# Patient Record
Sex: FEMALE | Race: WHITE | Employment: PART TIME | URBAN - NONMETROPOLITAN AREA
[De-identification: names, ages, dates, MRNs, and addresses within clinical notes are randomized per-mention and may not be internally consistent; named-entity substitution may affect disease eponyms.]

---

## 2017-10-05 ENCOUNTER — HOSPITAL ENCOUNTER (EMERGENCY)
Age: 37
Discharge: HOME | End: 2017-10-05
Attending: EMERGENCY MEDICINE

## 2017-10-05 VITALS
RESPIRATION RATE: 20 BRPM | SYSTOLIC BLOOD PRESSURE: 128 MMHG | OXYGEN SATURATION: 100 % | TEMPERATURE: 98.1 F | DIASTOLIC BLOOD PRESSURE: 81 MMHG | HEART RATE: 96 BPM

## 2017-10-05 DIAGNOSIS — K12.0 APHTHOUS ULCER: Primary | ICD-10-CM

## 2017-10-05 PROCEDURE — 99283 EMERGENCY DEPT VISIT LOW MDM: CPT

## 2017-10-05 ASSESSMENT — PAIN SCALES - GENERAL: PAINLEVEL_OUTOF10: 6

## 2017-10-05 ASSESSMENT — ENCOUNTER SYMPTOMS
EYE DISCHARGE: 0
FACIAL SWELLING: 0
SHORTNESS OF BREATH: 0
EYE REDNESS: 0
COUGH: 0
TROUBLE SWALLOWING: 0

## 2017-10-05 ASSESSMENT — PAIN DESCRIPTION - FREQUENCY: FREQUENCY: CONTINUOUS

## 2017-10-05 ASSESSMENT — PAIN DESCRIPTION - ONSET: ONSET: SUDDEN

## 2017-10-05 ASSESSMENT — PAIN DESCRIPTION - ORIENTATION: ORIENTATION: RIGHT

## 2017-10-05 ASSESSMENT — PAIN DESCRIPTION - DESCRIPTORS: DESCRIPTORS: THROBBING

## 2017-10-05 ASSESSMENT — PAIN DESCRIPTION - LOCATION: LOCATION: OTHER (COMMENT)

## 2017-10-05 ASSESSMENT — PAIN DESCRIPTION - DIRECTION: RADIATING_TOWARDS: LOCALIZED

## 2017-10-05 NOTE — ED PROVIDER NOTES
HPI  Patient is a 65-year-old female presents to the emergency department complaining of a bump on her right upper lip. Patient states she has some swelling noted there. She denies any fever or chills. She states she noticed it this morning. She states it's just swollen and that's why she came in. She denies any sore throat or difficulty swallowing or other associated symptoms. She states that it is mildly Tender      Review of Systems   HENT: Positive for mouth sores. Negative for congestion, drooling, ear pain, facial swelling and trouble swallowing. Eyes: Negative for discharge and redness. Respiratory: Negative for cough and shortness of breath. Skin: Negative for rash and wound. Allergic/Immunologic: Negative for environmental allergies. Physical Exam     HEENT: Patient has a early aphthous ulcer to the right upper lip on the inside. It is mildly swollen but there is no erythema or warmthsign of other infection. It is approximately the size of a small pea. There is no swelling erythema or exudate to the oropharynx. Heart: Regular rate and rhythm    Lungs: Clear to auscultation bilaterally    Skin: No rash or abnormality seen. Procedures    MDM  Here in the ED I discussed with the patient and advised her to do hot compresses and to follow up with her primary doctor. I believe this is an early viral aphthous ulcer and should resolve spontaneously. ED Course     Patient will be discharged home.     Diagnosis: Viral aphthous ulcer     Jenaro Mccoy MD  10/05/17 [id number]

## 2017-10-05 NOTE — ED TRIAGE NOTES
Medication list:  complete  Medication information provided by:  Patient  Meds:  per verbal statement

## 2017-10-05 NOTE — ED AVS SNAPSHOT
After Visit Summary  (Discharge Instructions)    Medication List for Home    Based on the information you provided to us as well as any changes during this visit, the following is your updated medication list.  Compare this with your prescription bottles at home. If you have any questions or concerns, contact your primary care physician's office. Daily Medication List (This medication list can be shared with any Healthcare provider who is helping you manage your medications)      ASK your doctor about these medications if you have questions     acetaminophen 500 MG tablet   Commonly known as:  TYLENOL   Take 1,000 mg by mouth every 6 hours as needed for Pain. albuterol sulfate  (90 Base) MCG/ACT inhaler   Commonly known as:  PROAIR HFA   Inhale 2 puffs into the lungs every 6 hours as needed for Wheezing       naproxen 375 MG tablet   Commonly known as:  NAPROSYN   Take 1 tablet by mouth 2 times daily               Allergies as of 10/5/2017        Reactions    Demerol Hcl [Meperidine] Itching    States she honestly doesn't know if allergic,she was told by her mother it was given after nasal surgery and she began itching, was given an antedote     Methylprednisolone Other (See Comments)    Heart racing, GI upset, left arm tingling, jitteriness      Immunizations as of 10/5/2017     Name Date Dose VIS Date Route    Tdap (Boostrix, Adacel) 12/8/2013 0.5 mL 5/9/2013 Intramuscular         After Visit Summary    This summary was created for you. Thank you for entrusting your care to us.   The following information includes details about your hospital/visit stay along with steps you should take to help with your recovery once you leave the hospital.  In this packet, you will find information about the topics listed below:    · Instructions about your medications including a list of your home medications  · A summary of your hospital visit · Follow-up appointments once you have left the hospital  · Your care plan at home      You may receive a survey regarding the care you received during your stay. Your input is valuable to us. We encourage you to complete and return your survey in the envelope provided. We hope you will choose us in the future for your healthcare needs. Patient Information     Patient Name DOB Edrie Angelucci 1980      Care Provided at:     Name Address Phone       [address] [phone]            Your Visit    Here you will find information about your visit, including the reason for your visit. Please take this sheet with you when you visit your doctor or other health care provider in the future. It will help determine the best possible medical care for you at that time. If you have any questions once you leave the hospital, please call the department phone number listed below. Diagnoses this visit     Your diagnosis was APHTHOUS ULCER. Visit Information     Date of Visit Department Dept Phone    10/5/2017 [facility] [phone]      You were seen by     You were seen by Boby Hatch MD.       Follow-up Appointments    Below is a list of your follow-up and future appointments. This may not be a complete list as you may have made appointments directly with providers that we are not aware of or your providers may have made some for you. Please call your providers to confirm appointments. It is important to keep your appointments. Please bring your current insurance card, photo ID, co-pay, and all medication bottles to your appointment. If self-pay, payment is expected at the time of service. Follow-up Information     Follow up with Sam Roth MD In 3 days.     Why:  As needed    Contact information:    [address] [facility] [phone]        Preventive Care        Date Due ? Review your current list of  medications, immunization, and allergies. ? Review your future test results online . ? Review your discharge instructions provided by your caregivers at discharge    Certain functionality such as prescription refills, scheduling appointments or sending messages to your provider are not activated if your provider does not use [facility] in his/her office    For questions regarding your MyChart account call [phone]. If you have a clinical question, please call your doctor's office. The information on all pages of the After Visit Summary has been reviewed with me, the patient and/or responsible adult, by my health care provider(s). I had the opportunity to ask questions regarding this information. I understand I should dispose of my armband safely at home to protect my health information. A complete copy of the After Visit Summary has been given to me, the patient and/or responsible adult. Patient Signature/Responsible Adult: ___________________________________    Nurse Signature: ___________________________________  Resident/MLP Signature: ___________________________________  Attending Signature: ___________________________________    Date:____________Time:____________              Discharge Instructions            Canker Sore: Care Instructions  Your Care Instructions  Canker sores are painful white sores in the mouth. They usually begin with a tingling feeling, followed by a red spot or bump that turns white. Canker sores appear most often on the tongue, inside the cheeks, and inside the lips. They can be very painful and can make talking, eating, and drinking difficult. A canker sore may form after an injury or stretching of tissues in the mouth, which can happen, for example, during a dental procedure or teeth cleaning. If you accidentally bite your tongue or the inside of your cheek, you may end up with a canker sore.  Other possible causes are infection, certain foods, and stress. Canker sores are not contagious. Today, please do hot compresses on the bump until it decreases in size. The pain from your canker sore should decrease in 7 to 10 days, and it should heal completely in 1 to 3 weeks. In most cases, a canker sore will go away by itself. Home treatment can ease pain and discomfort. If you have a large or deep canker sore that does not seem to be getting better after 2 weeks, your doctor may prescribe medicine. Canker sores often come back again. Follow-up care is a key part of your treatment and safety. Be sure to make and go to all appointments, and call your doctor if you are having problems. It's also a good idea to know your test results and keep a list of the medicines you take. How can you care for yourself at home? · Drink cold liquids, such as water or iced tea, or eat flavored ice pops or frozen juices. Use a straw to keep the liquid from coming in contact with your canker sore. · Eat soft, bland foods that are easy to chew and swallow, such as ice cream, custard, applesauce, cottage cheese, macaroni and cheese, soft-cooked eggs, yogurt, or cream soups. · Cut foods into small pieces, or grind, mash, blend, or puree foods to make them easier to chew and swallow. · While your canker sore heals, avoid coffee, chocolate, spicy and salty foods, citrus fruits, nuts, seeds, and tomatoes. · To soothe your canker sore and help it heal:  ¨ Use an over-the-counter numbing medicine, such as Orabase or Anbesol. ¨ Dab a bit of Milk of Magnesia on the canker sore 3 or 4 times a day. · Put ice on your sore to reduce the pain. · Take anti-inflammatory medicines to reduce pain, as needed. These include ibuprofen (Advil, Motrin) and naproxen (Aleve). Read and follow all instructions on the label. · Use a soft-bristle toothbrush, and brush your teeth well but carefully.   · Do not smoke or use spit tobacco. Tobacco can cause mouth problems and slow healing. If you need help quitting, talk to your doctor about stop-smoking programs and medicines. These can increase your chances of quitting for good. When should you call for help? Call your doctor now or seek immediate medical care if:  · You have signs of infection, such as:  ¨ Increased pain, swelling, warmth, or redness. ¨ Red streaks leading from the area. ¨ Pus draining from the area. ¨ A fever. Watch closely for changes in your health, and be sure to contact your doctor if:  · You do not get better as expected. Where can you learn more? Go to https://Super[employer] LabspeSkytreeeb.21viaNet. org and sign in to your [employer] account. Enter D047 in the StudyEgg box to learn more about \"Canker Sore: Care Instructions. \"     If you do not have an account, please click on the \"Sign Up Now\" link. Current as of: December 28, 2016  Content Version: 11.3  © 2191-1149 [employer]. Care instructions adapted under license by [facility] ([facility]). If you have questions about a medical condition or this instruction, always ask your healthcare professional. [place] [address] any warranty or liability for your use of this information.

## 2019-09-28 ENCOUNTER — APPOINTMENT (OUTPATIENT)
Dept: CT IMAGING | Age: 39
End: 2019-09-28
Payer: COMMERCIAL

## 2019-09-28 ENCOUNTER — HOSPITAL ENCOUNTER (EMERGENCY)
Age: 39
Discharge: HOME | End: 2019-09-28
Attending: FAMILY MEDICINE
Payer: COMMERCIAL

## 2019-09-28 VITALS
HEART RATE: 87 BPM | BODY MASS INDEX: 18.36 KG/M2 | RESPIRATION RATE: 18 BRPM | SYSTOLIC BLOOD PRESSURE: 123 MMHG | OXYGEN SATURATION: 99 % | DIASTOLIC BLOOD PRESSURE: 77 MMHG | HEIGHT: 61 IN | WEIGHT: 97.22 LBS | TEMPERATURE: 98.3 F

## 2019-09-28 DIAGNOSIS — N94.89 ADNEXAL MASS: Primary | ICD-10-CM

## 2019-09-28 LAB
ABSOLUTE EOS #: 0.1 K/UL (ref 0–0.4)
ABSOLUTE IMMATURE GRANULOCYTE: NORMAL K/UL (ref 0–0.3)
ABSOLUTE LYMPH #: 2.1 K/UL (ref 1–4.8)
ABSOLUTE MONO #: 0.5 K/UL (ref 0–1)
ALBUMIN SERPL-MCNC: 4.5 G/DL (ref 3.5–5.2)
ALBUMIN/GLOBULIN RATIO: ABNORMAL (ref 1–2.5)
ALP BLD-CCNC: 47 U/L (ref 35–104)
ALT SERPL-CCNC: 10 U/L (ref 5–33)
AMYLASE: 49 U/L (ref 28–100)
ANION GAP SERPL CALCULATED.3IONS-SCNC: 12 MMOL/L (ref 9–17)
AST SERPL-CCNC: 15 U/L
BASOPHILS # BLD: 0 % (ref 0–2)
BASOPHILS ABSOLUTE: 0 K/UL (ref 0–0.2)
BILIRUB SERPL-MCNC: 0.48 MG/DL (ref 0.3–1.2)
BILIRUBIN URINE: NEGATIVE
BUN BLDV-MCNC: 8 MG/DL (ref 6–20)
BUN/CREAT BLD: 14 (ref 9–20)
CALCIUM SERPL-MCNC: 10.1 MG/DL (ref 8.6–10.4)
CHLORIDE BLD-SCNC: 104 MMOL/L (ref 98–107)
CO2: 22 MMOL/L (ref 20–31)
COLOR: ABNORMAL
COMMENT UA: ABNORMAL
CREAT SERPL-MCNC: 0.58 MG/DL (ref 0.5–0.9)
DIFFERENTIAL TYPE: YES
EOSINOPHILS RELATIVE PERCENT: 1 % (ref 0–5)
GFR AFRICAN AMERICAN: >60 ML/MIN
GFR NON-AFRICAN AMERICAN: >60 ML/MIN
GFR SERPL CREATININE-BSD FRML MDRD: ABNORMAL ML/MIN/{1.73_M2}
GFR SERPL CREATININE-BSD FRML MDRD: ABNORMAL ML/MIN/{1.73_M2}
GLUCOSE BLD-MCNC: 111 MG/DL (ref 70–99)
GLUCOSE URINE: NEGATIVE
HCG QUALITATIVE: NEGATIVE
HCT VFR BLD CALC: 42.1 % (ref 36–46)
HEMOGLOBIN: 14.4 G/DL (ref 12–16)
IMMATURE GRANULOCYTES: NORMAL %
KETONES, URINE: NEGATIVE
LEUKOCYTE ESTERASE, URINE: NEGATIVE
LIPASE: 26 U/L (ref 13–60)
LYMPHOCYTES # BLD: 22 % (ref 15–40)
MCH RBC QN AUTO: 32.2 PG (ref 26–34)
MCHC RBC AUTO-ENTMCNC: 34.3 G/DL (ref 31–37)
MCV RBC AUTO: 94 FL (ref 80–100)
MONOCYTES # BLD: 5 % (ref 4–8)
NITRITE, URINE: NEGATIVE
NRBC AUTOMATED: NORMAL PER 100 WBC
PDW BLD-RTO: 13.1 % (ref 12.1–15.2)
PH UA: 6.5 (ref 5–8)
PLATELET # BLD: 195 K/UL (ref 140–450)
PLATELET ESTIMATE: NORMAL
PMV BLD AUTO: NORMAL FL (ref 6–12)
POTASSIUM SERPL-SCNC: 4 MMOL/L (ref 3.7–5.3)
PROTEIN UA: NEGATIVE
RBC # BLD: 4.48 M/UL (ref 4–5.2)
RBC # BLD: NORMAL 10*6/UL
SEG NEUTROPHILS: 72 % (ref 47–75)
SEGMENTED NEUTROPHILS ABSOLUTE COUNT: 6.7 K/UL (ref 2.5–7)
SODIUM BLD-SCNC: 138 MMOL/L (ref 135–144)
SPECIFIC GRAVITY UA: 1 (ref 1–1.03)
TOTAL PROTEIN: 7.5 G/DL (ref 6.4–8.3)
TURBIDITY: CLEAR
URINE HGB: NEGATIVE
UROBILINOGEN, URINE: NORMAL
WBC # BLD: 9.4 K/UL (ref 3.5–11)
WBC # BLD: NORMAL 10*3/UL

## 2019-09-28 PROCEDURE — 80053 COMPREHEN METABOLIC PANEL: CPT

## 2019-09-28 PROCEDURE — 84703 CHORIONIC GONADOTROPIN ASSAY: CPT

## 2019-09-28 PROCEDURE — 6360000002 HC RX W HCPCS: Performed by: FAMILY MEDICINE

## 2019-09-28 PROCEDURE — 96374 THER/PROPH/DIAG INJ IV PUSH: CPT

## 2019-09-28 PROCEDURE — 96361 HYDRATE IV INFUSION ADD-ON: CPT

## 2019-09-28 PROCEDURE — 74176 CT ABD & PELVIS W/O CONTRAST: CPT

## 2019-09-28 PROCEDURE — 36415 COLL VENOUS BLD VENIPUNCTURE: CPT

## 2019-09-28 PROCEDURE — 99284 EMERGENCY DEPT VISIT MOD MDM: CPT

## 2019-09-28 PROCEDURE — 83690 ASSAY OF LIPASE: CPT

## 2019-09-28 PROCEDURE — 82150 ASSAY OF AMYLASE: CPT

## 2019-09-28 PROCEDURE — 81003 URINALYSIS AUTO W/O SCOPE: CPT

## 2019-09-28 PROCEDURE — 2580000003 HC RX 258: Performed by: FAMILY MEDICINE

## 2019-09-28 PROCEDURE — 85025 COMPLETE CBC W/AUTO DIFF WBC: CPT

## 2019-09-28 RX ORDER — KETOROLAC TROMETHAMINE 30 MG/ML
15 INJECTION, SOLUTION INTRAMUSCULAR; INTRAVENOUS ONCE
Status: COMPLETED | OUTPATIENT
Start: 2019-09-28 | End: 2019-09-28

## 2019-09-28 RX ORDER — KETOROLAC TROMETHAMINE 10 MG/1
10 TABLET, FILM COATED ORAL EVERY 6 HOURS PRN
Qty: 5 TABLET | Refills: 0 | Status: SHIPPED | OUTPATIENT
Start: 2019-09-28

## 2019-09-28 RX ORDER — 0.9 % SODIUM CHLORIDE 0.9 %
1000 INTRAVENOUS SOLUTION INTRAVENOUS ONCE
Status: COMPLETED | OUTPATIENT
Start: 2019-09-28 | End: 2019-09-28

## 2019-09-28 RX ADMIN — KETOROLAC TROMETHAMINE 15 MG: 30 INJECTION, SOLUTION INTRAMUSCULAR; INTRAVENOUS at 13:43

## 2019-09-28 RX ADMIN — SODIUM CHLORIDE 1000 ML: 9 INJECTION, SOLUTION INTRAVENOUS at 13:43

## 2019-09-28 ASSESSMENT — PAIN DESCRIPTION - ORIENTATION: ORIENTATION: RIGHT;LOWER

## 2019-09-28 ASSESSMENT — PAIN DESCRIPTION - PAIN TYPE: TYPE: ACUTE PAIN

## 2019-09-28 ASSESSMENT — PAIN DESCRIPTION - LOCATION: LOCATION: ABDOMEN

## 2019-09-28 ASSESSMENT — PAIN SCALES - GENERAL
PAINLEVEL_OUTOF10: 5
PAINLEVEL_OUTOF10: 3
PAINLEVEL_OUTOF10: 5

## 2019-09-28 ASSESSMENT — PAIN DESCRIPTION - FREQUENCY: FREQUENCY: CONTINUOUS

## 2019-09-28 ASSESSMENT — PAIN DESCRIPTION - ONSET: ONSET: ON-GOING

## 2019-09-28 ASSESSMENT — PAIN DESCRIPTION - DESCRIPTORS: DESCRIPTORS: SHARP

## 2023-01-09 ENCOUNTER — OFFICE VISIT (OUTPATIENT)
Dept: FAMILY MEDICINE CLINIC | Age: 43
End: 2023-01-09
Payer: COMMERCIAL

## 2023-01-09 VITALS
WEIGHT: 108 LBS | RESPIRATION RATE: 18 BRPM | HEART RATE: 61 BPM | OXYGEN SATURATION: 100 % | DIASTOLIC BLOOD PRESSURE: 72 MMHG | HEIGHT: 61 IN | BODY MASS INDEX: 20.39 KG/M2 | SYSTOLIC BLOOD PRESSURE: 108 MMHG

## 2023-01-09 DIAGNOSIS — Z13.220 LIPID SCREENING: ICD-10-CM

## 2023-01-09 DIAGNOSIS — R00.2 PALPITATION: Primary | ICD-10-CM

## 2023-01-09 DIAGNOSIS — R07.9 CHEST PAIN, UNSPECIFIED TYPE: ICD-10-CM

## 2023-01-09 PROCEDURE — G8427 DOCREV CUR MEDS BY ELIG CLIN: HCPCS | Performed by: INTERNAL MEDICINE

## 2023-01-09 PROCEDURE — 99204 OFFICE O/P NEW MOD 45 MIN: CPT | Performed by: INTERNAL MEDICINE

## 2023-01-09 PROCEDURE — G8420 CALC BMI NORM PARAMETERS: HCPCS | Performed by: INTERNAL MEDICINE

## 2023-01-09 PROCEDURE — 1036F TOBACCO NON-USER: CPT | Performed by: INTERNAL MEDICINE

## 2023-01-09 PROCEDURE — G8484 FLU IMMUNIZE NO ADMIN: HCPCS | Performed by: INTERNAL MEDICINE

## 2023-01-09 SDOH — ECONOMIC STABILITY: FOOD INSECURITY: WITHIN THE PAST 12 MONTHS, YOU WORRIED THAT YOUR FOOD WOULD RUN OUT BEFORE YOU GOT MONEY TO BUY MORE.: NEVER TRUE

## 2023-01-09 SDOH — ECONOMIC STABILITY: FOOD INSECURITY: WITHIN THE PAST 12 MONTHS, THE FOOD YOU BOUGHT JUST DIDN'T LAST AND YOU DIDN'T HAVE MONEY TO GET MORE.: NEVER TRUE

## 2023-01-09 ASSESSMENT — PATIENT HEALTH QUESTIONNAIRE - PHQ9
SUM OF ALL RESPONSES TO PHQ QUESTIONS 1-9: 0
1. LITTLE INTEREST OR PLEASURE IN DOING THINGS: 0
SUM OF ALL RESPONSES TO PHQ QUESTIONS 1-9: 0
2. FEELING DOWN, DEPRESSED OR HOPELESS: 0
SUM OF ALL RESPONSES TO PHQ9 QUESTIONS 1 & 2: 0
SUM OF ALL RESPONSES TO PHQ QUESTIONS 1-9: 0
SUM OF ALL RESPONSES TO PHQ QUESTIONS 1-9: 0

## 2023-01-09 ASSESSMENT — ENCOUNTER SYMPTOMS
COUGH: 0
SORE THROAT: 0
NAUSEA: 0
SHORTNESS OF BREATH: 0
ABDOMINAL PAIN: 0

## 2023-01-09 ASSESSMENT — SOCIAL DETERMINANTS OF HEALTH (SDOH): HOW HARD IS IT FOR YOU TO PAY FOR THE VERY BASICS LIKE FOOD, HOUSING, MEDICAL CARE, AND HEATING?: NOT HARD AT ALL

## 2023-01-09 NOTE — PATIENT INSTRUCTIONS
SURVEY:    You may be receiving a survey from [email or web address] regarding your visit today. Please complete the survey to enable us to provide the highest quality of care to you and your family. If you cannot score us a very good on any question, please call the office to discuss how we could have made your experience a very good one. Thank you.   MD Keshia العلي MD Launie Cairo, MD Lindbergh Silvius, DO  Jeannine Gutierres, [facility] [place], [address] [facility], [address] [place] [address]  Michelle Grover, [facility]

## 2023-01-09 NOTE — PROGRESS NOTES
Ana Notes    Name: Leola Jordan  : 1980         Chief Complaint:     Chief Complaint   Patient presents with    New Patient     Patient has been having heart palpations and ear ringing with tingling in arms       History of Present Illness:        Marty Lucero is a new patient who presents to office to establish care. She has no history of chronic medical problems, currently not on prescription medications. Has not seen a family physician for many years. She c/o episodes of palpitations, chest pains, \" whooshing \" sound in her ears, \"left arm goes numb\", feels dizzy. Has to rest for symptoms to resolve. She had these symptoms for about 3 years. Symptoms occur  2-3 times per week. Has not seen a physician and has not had any work up so far. She quit smoking about a year ago. Used to smoke 0.5 ppd x 20 years. Mother had a heart attack at age 72. She states that when she was younger she was diagnosed with ADD. She feels that she might have anxiety disorder. Chest Pain   This is a recurrent problem. The current episode started more than 1 year ago. The problem occurs every several days. The problem has been unchanged. The pain is present in the substernal region. The pain is moderate. The quality of the pain is described as heavy. The pain radiates to the left shoulder. Associated symptoms include palpitations. Pertinent negatives include no abdominal pain, cough, fever, headaches, nausea or shortness of breath. The pain is aggravated by nothing. She has tried nothing for the symptoms. Palpitations   This is a chronic problem. The current episode started more than 1 year ago. The problem occurs every several days. The problem has been unchanged. Nothing aggravates the symptoms. Associated symptoms include anxiety and chest pain. Pertinent negatives include no coughing, fever, nausea or shortness of breath. She has tried bed rest for the symptoms.  The treatment provided significant relief. Risk factors include stress and smoking/tobacco exposure. Past Medical History:     Past Medical History:   Diagnosis Date    Chronic back pain     Headache       Reviewed all health maintenance requirements and orderedappropriate tests  Health Maintenance Due   Topic Date Due    Depression Screen  Never done    HIV screen  Never done    Hepatitis C screen  Never done    Cervical cancer screen  Never done    Lipids  Never done    COVID-19 Vaccine (2 - Booster for Lulu series) 08/23/2021    Flu vaccine (1) 08/01/2022       Past Surgical History:     Past Surgical History:   Procedure Laterality Date    DENTAL SURGERY      DILATION AND CURETTAGE OF UTERUS      NASAL SEPTUM SURGERY      OTHER SURGICAL HISTORY Left 05/19/2017    Decompression of L4-5    TIBIA FRACTURE SURGERY      right        Medications:       Prior to Admission medications    Medication Sig Start Date End Date Taking? Authorizing Provider   ketorolac (TORADOL) 10 MG tablet Take 1 tablet by mouth every 6 hours as needed for Pain  Patient not taking: Reported on 1/9/2023 9/28/19   Joao Acosta MD   acetaminophen (TYLENOL) 500 MG tablet Take 1,000 mg by mouth every 6 hours as needed for Pain. Patient not taking: Reported on 1/9/2023    Historical Provider, MD        Allergies:       Demerol hcl [meperidine] and Methylprednisolone    Social History:     Tobacco: reports that she has quit smoking. Her smoking use included cigarettes. She has a 6.00 pack-year smoking history. She has never used smokeless tobacco.  Alcohol:      reports no history of alcohol use. Drug Use:  reports no history of drug use. Family History:     No family history on file. Review of Systems:         Review of Systems   Constitutional:  Negative for chills and fever. HENT:  Negative for congestion and sore throat. Respiratory:  Negative for cough and shortness of breath.     Cardiovascular:  Positive for chest pain and palpitations. Gastrointestinal:  Negative for abdominal pain and nausea. Genitourinary:  Negative for dysuria. Skin:  Negative for rash. Neurological:  Negative for headaches. Psychiatric/Behavioral:  The patient is nervous/anxious. Physical Exam:     Vitals:  /72 (Site: Right Upper Arm, Position: Sitting, Cuff Size: Small Adult)   Pulse 61   Resp 18   Ht 5' 1\" (1.549 m)   Wt 108 lb (49 kg)   SpO2 100%   BMI 20.41 kg/m²       Physical Exam  Vitals reviewed. Constitutional:       General: She is not in acute distress. Appearance: She is well-developed. HENT:      Head: Normocephalic and atraumatic. Neck:      Thyroid: No thyromegaly. Cardiovascular:      Rate and Rhythm: Normal rate and regular rhythm. Heart sounds: Normal heart sounds. No murmur heard. Pulmonary:      Effort: Pulmonary effort is normal.      Breath sounds: Normal breath sounds. No wheezing or rales. Abdominal:      General: Bowel sounds are normal. There is no distension. Palpations: Abdomen is soft. There is no mass. Tenderness: There is no abdominal tenderness. Musculoskeletal:         General: Normal range of motion. Lymphadenopathy:      Cervical: No cervical adenopathy. Skin:     General: Skin is warm and dry. Findings: No rash. Neurological:      Mental Status: She is alert and oriented to person, place, and time.    Psychiatric:         Behavior: Behavior normal.         Judgment: Judgment normal.             Data:     Lab Results   Component Value Date/Time     09/28/2019 12:55 PM    K 4.0 09/28/2019 12:55 PM     09/28/2019 12:55 PM    CO2 22 09/28/2019 12:55 PM    BUN 8 09/28/2019 12:55 PM    CREATININE 0.58 09/28/2019 12:55 PM    GLUCOSE 111 09/28/2019 12:55 PM    PROT 7.5 09/28/2019 12:55 PM    LABALBU 4.5 09/28/2019 12:55 PM    BILITOT 0.48 09/28/2019 12:55 PM    ALKPHOS 47 09/28/2019 12:55 PM    AST 15 09/28/2019 12:55 PM    ALT 10 09/28/2019 12:55 PM Lab Results   Component Value Date/Time    WBC 9.4 09/28/2019 12:55 PM    RBC 4.48 09/28/2019 12:55 PM    HGB 14.4 09/28/2019 12:55 PM    HCT 42.1 09/28/2019 12:55 PM    MCV 94.0 09/28/2019 12:55 PM    MCH 32.2 09/28/2019 12:55 PM    MCHC 34.3 09/28/2019 12:55 PM    RDW 13.1 09/28/2019 12:55 PM     09/28/2019 12:55 PM    MPV NOT REPORTED 09/28/2019 12:55 PM     No results found for: TSH  No results found for: CHOL, LDL, HDL, PSA, LABA1C       Assessment & Plan        Diagnosis Orders   1. Palpitation  CBC with Auto Differential    Basic Metabolic Panel    EKG 12 lead    TSH with Reflex    XR CHEST (2 VW)    Magnesium      2. Chest pain, unspecified type  CBC with Auto Differential    Basic Metabolic Panel    XR CHEST (2 VW)      3. Lipid screening  Lipid Panel    Hepatic Function Panel      We will order pertinent labs for evaluation of palpitations. Rule out metabolic disorder, anemia, thyroid problems, electrolyte disorders. Also ordered EKG, chest x-ray. Further work-up will depend on the results. Follow-up in 1 or 2 weeks                Completed Refills   Requested Prescriptions      No prescriptions requested or ordered in this encounter     No follow-ups on file. No orders of the defined types were placed in this encounter.     Orders Placed This Encounter   Procedures    XR CHEST (2 VW)     Standing Status:   Future     Standing Expiration Date:   1/9/2024    CBC with Auto Differential     Standing Status:   Future     Standing Expiration Date:   8/4/9541    Basic Metabolic Panel     Standing Status:   Future     Standing Expiration Date:   1/9/2024    Lipid Panel     Standing Status:   Future     Standing Expiration Date:   1/9/2024     Order Specific Question:   Is Patient Fasting?/# of Hours     Answer:   yes    TSH with Reflex     Standing Status:   Future     Standing Expiration Date:   1/9/2024    Hepatic Function Panel     Standing Status:   Future     Standing Expiration Date: 1/9/2024    Magnesium     Standing Status:   Future     Standing Expiration Date:   1/9/2024    EKG 12 lead     Standing Status:   Future     Standing Expiration Date:   3/10/2023     Order Specific Question:   Reason for Exam?     Answer:   Chest pain           Patient Instructions     SURVEY:    You may be receiving a survey from [employer] regarding your visit today. Please complete the survey to enable us to provide the highest quality of care to you and your family. If you cannot score us a very good on any question, please call the office to discuss how we could have made your experience a very good one. Thank you.   MD Madhuri العلي MD Neta Dine, MD Neville Antoine, DO Jeannine Gutierres, PM  [facility] [place], [place]  [place], [place]  [facility]    Electronically signed by Grace Moran MD on 1/9/2023 at 1:53 PM           Completed Refills      Requested Prescriptions      No prescriptions requested or ordered in this encounter

## 2023-01-15 ENCOUNTER — HOSPITAL ENCOUNTER (OUTPATIENT)
Age: 43
Discharge: HOME | End: 2023-01-15
Payer: MEDICAID

## 2023-01-15 ENCOUNTER — HOSPITAL ENCOUNTER (OUTPATIENT)
Dept: GENERAL RADIOLOGY | Age: 43
Discharge: HOME | End: 2023-01-17
Payer: MEDICAID

## 2023-01-15 ENCOUNTER — HOSPITAL ENCOUNTER (OUTPATIENT)
Age: 43
Discharge: HOME | End: 2023-01-17
Payer: MEDICAID

## 2023-01-15 DIAGNOSIS — R07.9 CHEST PAIN, UNSPECIFIED TYPE: ICD-10-CM

## 2023-01-15 DIAGNOSIS — R00.2 PALPITATION: ICD-10-CM

## 2023-01-15 DIAGNOSIS — Z13.220 LIPID SCREENING: ICD-10-CM

## 2023-01-15 LAB
ABSOLUTE EOS #: 0.1 K/UL (ref 0–0.4)
ABSOLUTE LYMPH #: 1.6 K/UL (ref 1–4.8)
ABSOLUTE MONO #: 0.3 K/UL (ref 0–1)
ALBUMIN SERPL-MCNC: 4.4 G/DL (ref 3.5–5.2)
ALP BLD-CCNC: 45 U/L (ref 35–104)
ALT SERPL-CCNC: 11 U/L (ref 5–33)
ANION GAP SERPL CALCULATED.3IONS-SCNC: 11 MMOL/L (ref 9–17)
AST SERPL-CCNC: 16 U/L
BASOPHILS # BLD: 0 % (ref 0–2)
BASOPHILS ABSOLUTE: 0 K/UL (ref 0–0.2)
BILIRUB SERPL-MCNC: 0.5 MG/DL (ref 0.3–1.2)
BILIRUBIN DIRECT: <0.1 MG/DL
BILIRUBIN, INDIRECT: NORMAL MG/DL (ref 0–1)
BUN BLDV-MCNC: 10 MG/DL (ref 6–20)
BUN/CREAT BLD: 14 (ref 9–20)
CALCIUM SERPL-MCNC: 9 MG/DL (ref 8.6–10.4)
CHLORIDE BLD-SCNC: 101 MMOL/L (ref 98–107)
CHOLESTEROL/HDL RATIO: 4.2
CHOLESTEROL: 208 MG/DL
CO2: 23 MMOL/L (ref 20–31)
CREAT SERPL-MCNC: 0.7 MG/DL (ref 0.5–0.9)
DIFFERENTIAL TYPE: YES
EKG ATRIAL RATE: 61 BPM
EKG P AXIS: 67 DEGREES
EKG P-R INTERVAL: 118 MS
EKG Q-T INTERVAL: 410 MS
EKG QRS DURATION: 78 MS
EKG QTC CALCULATION (BAZETT): 412 MS
EKG R AXIS: 79 DEGREES
EKG T AXIS: 63 DEGREES
EKG VENTRICULAR RATE: 61 BPM
EOSINOPHILS RELATIVE PERCENT: 1 % (ref 0–5)
GFR SERPL CREATININE-BSD FRML MDRD: >60 ML/MIN/1.73M2
GLUCOSE BLD-MCNC: 97 MG/DL (ref 70–99)
HCT VFR BLD CALC: 40.1 % (ref 36–46)
HDLC SERPL-MCNC: 50 MG/DL
HEMOGLOBIN: 13.4 G/DL (ref 12–16)
LDL CHOLESTEROL: 145 MG/DL (ref 0–130)
LYMPHOCYTES # BLD: 33 % (ref 15–40)
MAGNESIUM: 1.8 MG/DL (ref 1.6–2.6)
MCH RBC QN AUTO: 31.4 PG (ref 26–34)
MCHC RBC AUTO-ENTMCNC: 33.5 G/DL (ref 31–37)
MCV RBC AUTO: 93.8 FL (ref 80–100)
MONOCYTES # BLD: 6 % (ref 4–8)
PDW BLD-RTO: 12.9 % (ref 12.1–15.2)
PLATELET # BLD: 209 K/UL (ref 140–450)
POTASSIUM SERPL-SCNC: 3.7 MMOL/L (ref 3.7–5.3)
RBC # BLD: 4.27 M/UL (ref 4–5.2)
SEG NEUTROPHILS: 60 % (ref 47–75)
SEGMENTED NEUTROPHILS ABSOLUTE COUNT: 3 K/UL (ref 2.5–7)
SODIUM BLD-SCNC: 135 MMOL/L (ref 135–144)
TOTAL PROTEIN: 7.2 G/DL (ref 6.4–8.3)
TRIGL SERPL-MCNC: 67 MG/DL
TSH SERPL DL<=0.05 MIU/L-ACNC: 2.03 UIU/ML (ref 0.3–5)
WBC # BLD: 4.9 K/UL (ref 3.5–11)

## 2023-01-15 PROCEDURE — 83735 ASSAY OF MAGNESIUM: CPT

## 2023-01-15 PROCEDURE — 36415 COLL VENOUS BLD VENIPUNCTURE: CPT

## 2023-01-15 PROCEDURE — 85025 COMPLETE CBC W/AUTO DIFF WBC: CPT

## 2023-01-15 PROCEDURE — 71046 X-RAY EXAM CHEST 2 VIEWS: CPT

## 2023-01-15 PROCEDURE — 80076 HEPATIC FUNCTION PANEL: CPT

## 2023-01-15 PROCEDURE — 84443 ASSAY THYROID STIM HORMONE: CPT

## 2023-01-15 PROCEDURE — 80061 LIPID PANEL: CPT

## 2023-01-15 PROCEDURE — 93005 ELECTROCARDIOGRAM TRACING: CPT

## 2023-01-15 PROCEDURE — 80048 BASIC METABOLIC PNL TOTAL CA: CPT

## 2023-01-16 ENCOUNTER — TELEPHONE (OUTPATIENT)
Dept: FAMILY MEDICINE CLINIC | Age: 43
End: 2023-01-16

## 2023-01-16 NOTE — TELEPHONE ENCOUNTER
----- Message from Cata Rodríguez MD sent at 1/16/2023 10:26 AM EST -----  Please let the patient know that her EKG and lab results are essentially normal.  Thank you  ----- Message -----  From: Agus Gramajo Incoming Ekg Results From [facility]  Sent: 1/15/2023   4:54 PM EST  To: Cata Rodríguez MD

## 2023-01-16 NOTE — TELEPHONE ENCOUNTER
----- Message from Po Marroquin MD sent at 1/16/2023  3:00 PM EST -----  Please let the patient know her chest x-ray shows mild emphysema otherwise normal  Thank you    ----- Message -----  From: Agus Gramajo Incoming Radiant Results From [facility]  Sent: 1/16/2023   2:14 PM EST  To: Po Marroquin MD

## 2023-02-09 ENCOUNTER — OFFICE VISIT (OUTPATIENT)
Dept: FAMILY MEDICINE CLINIC | Age: 43
End: 2023-02-09
Payer: MEDICAID

## 2023-02-09 VITALS
HEART RATE: 68 BPM | DIASTOLIC BLOOD PRESSURE: 68 MMHG | RESPIRATION RATE: 18 BRPM | BODY MASS INDEX: 20.5 KG/M2 | HEIGHT: 61 IN | SYSTOLIC BLOOD PRESSURE: 98 MMHG | WEIGHT: 108.6 LBS | OXYGEN SATURATION: 98 %

## 2023-02-09 DIAGNOSIS — M51.36 LUMBAR DEGENERATIVE DISC DISEASE: Primary | ICD-10-CM

## 2023-02-09 DIAGNOSIS — R20.2 PARESTHESIA OF LEFT LEG: ICD-10-CM

## 2023-02-09 DIAGNOSIS — M54.42 ACUTE LEFT-SIDED LOW BACK PAIN WITH LEFT-SIDED SCIATICA: ICD-10-CM

## 2023-02-09 DIAGNOSIS — F41.9 ANXIETY: ICD-10-CM

## 2023-02-09 PROCEDURE — 99214 OFFICE O/P EST MOD 30 MIN: CPT | Performed by: INTERNAL MEDICINE

## 2023-02-09 RX ORDER — GABAPENTIN 300 MG/1
300 CAPSULE ORAL 2 TIMES DAILY
Qty: 60 CAPSULE | Refills: 0 | Status: SHIPPED | OUTPATIENT
Start: 2023-02-09 | End: 2023-03-11

## 2023-02-09 RX ORDER — ESCITALOPRAM OXALATE 10 MG/1
10 TABLET ORAL DAILY
Qty: 30 TABLET | Refills: 3 | Status: SHIPPED | OUTPATIENT
Start: 2023-02-09

## 2023-02-09 RX ORDER — PREDNISONE 20 MG/1
TABLET ORAL
Qty: 15 TABLET | Refills: 0 | Status: SHIPPED | OUTPATIENT
Start: 2023-02-09

## 2023-02-09 SDOH — ECONOMIC STABILITY: FOOD INSECURITY: WITHIN THE PAST 12 MONTHS, THE FOOD YOU BOUGHT JUST DIDN'T LAST AND YOU DIDN'T HAVE MONEY TO GET MORE.: NEVER TRUE

## 2023-02-09 SDOH — ECONOMIC STABILITY: FOOD INSECURITY: WITHIN THE PAST 12 MONTHS, YOU WORRIED THAT YOUR FOOD WOULD RUN OUT BEFORE YOU GOT MONEY TO BUY MORE.: NEVER TRUE

## 2023-02-09 SDOH — ECONOMIC STABILITY: INCOME INSECURITY: HOW HARD IS IT FOR YOU TO PAY FOR THE VERY BASICS LIKE FOOD, HOUSING, MEDICAL CARE, AND HEATING?: NOT HARD AT ALL

## 2023-02-09 SDOH — ECONOMIC STABILITY: HOUSING INSECURITY
IN THE LAST 12 MONTHS, WAS THERE A TIME WHEN YOU DID NOT HAVE A STEADY PLACE TO SLEEP OR SLEPT IN A SHELTER (INCLUDING NOW)?: NO

## 2023-02-09 ASSESSMENT — ENCOUNTER SYMPTOMS
SHORTNESS OF BREATH: 0
BOWEL INCONTINENCE: 0
SORE THROAT: 0
BACK PAIN: 1
COUGH: 0
NAUSEA: 0
ABDOMINAL PAIN: 0

## 2023-02-09 NOTE — PROGRESS NOTES
HPI Notes    Name: Randy Henning  : 1980         Chief Complaint:     Chief Complaint   Patient presents with    Follow-up     Patient is asking about results for labs,ekg  and her back issues       History of Present Illness:        Josette Mendoza presents to office to follow up for Anxiety, palpitations, CP, dizziness. Recent work up including EKG was unremarkable. Patient thinks her symptoms are most likely related to her Anxiety and would like to be treated. Josette Mendoza presents with the complaint of anxiety. Current symptoms of anxiety include chest pain, dizziness, feelings of losing control, irritable, palpitations, paresthesias, racing thoughts, sweating. Josette Mendoza has felt anxious  for about a year . Josette Mendoza does have a history of anxiety or depression. There is  a family history of depression or anxiety. Current identifiable stressors include work stress, back pain. Zhane Gonzales denies the use of drugs or alcohol. Josette Mendoza denies depression. Today she also c/o back pain  Says had an  emergent back surgery about ? 2 years ago for lumbar  disk herniation. Everything was fine  after surgery until past year the pain started gradually  getting worse. Says her left leg is affected, has \"bad burning \"sensation. Left leg is tingling and sometimes feels weak. The pain in her lower back is squeezing,  burning, achy. Ibuprofen and Tylenol not helping. Back Pain  This is a chronic problem. The current episode started more than 1 year ago. The problem has been gradually worsening since onset. The pain is present in the lumbar spine and sacro-iliac. The pain radiates to the left knee, left thigh and left foot. The pain is severe. The symptoms are aggravated by bending, twisting, standing and position. Associated symptoms include chest pain, numbness, paresthesias, tingling and weakness.  Pertinent negatives include no abdominal pain, bladder incontinence, bowel incontinence, dysuria, fever, headaches, paresis or perianal numbness. She has tried NSAIDs (Tylenol) for the symptoms. The treatment provided mild relief. Past Medical History:     Past Medical History:   Diagnosis Date    Chronic back pain     Headache       Reviewed all health maintenance requirements and orderedappropriate tests  Health Maintenance Due   Topic Date Due    HIV screen  Never done    Hepatitis C screen  Never done    Cervical cancer screen  Never done    COVID-19 Vaccine (2 - Booster for Lulu series) 08/23/2021    Flu vaccine (1) 08/01/2022       Past Surgical History:     Past Surgical History:   Procedure Laterality Date    DENTAL SURGERY      DILATION AND CURETTAGE OF UTERUS      NASAL SEPTUM SURGERY      OTHER SURGICAL HISTORY Left 05/19/2017    Decompression of L4-5    TIBIA FRACTURE SURGERY      right        Medications:       Prior to Admission medications    Medication Sig Start Date End Date Taking? Authorizing Provider   predniSONE (DELTASONE) 20 MG tablet Take 2 tablets po daily x 5 days, then 1 tab po daily x 5 days 2/9/23  Yes Guero Barrientos MD   gabapentin (NEURONTIN) 300 MG capsule Take 1 capsule by mouth 2 times daily for 30 days. Intended supply: 30 days 2/9/23 3/11/23 Yes Guero Barrientos MD   escitalopram (LEXAPRO) 10 MG tablet Take 1 tablet by mouth daily 2/9/23  Yes Guero Barrientos MD        Allergies:       Demerol hcl [meperidine] and Methylprednisolone    Social History:     Tobacco: reports that she has quit smoking. Her smoking use included cigarettes. She has a 6.00 pack-year smoking history. She has never used smokeless tobacco.  Alcohol:      reports no history of alcohol use. Drug Use:  reports no history of drug use. Family History:     No family history on file. Review of Systems:         Review of Systems   Constitutional:  Positive for diaphoresis. Negative for chills, fatigue and fever. HENT:  Negative for congestion and sore throat.     Respiratory:  Negative for cough and shortness of breath. Cardiovascular:  Positive for chest pain and palpitations. Gastrointestinal:  Negative for abdominal pain, bowel incontinence and nausea. Genitourinary:  Negative for bladder incontinence and dysuria. Musculoskeletal:  Positive for arthralgias and back pain. Negative for joint swelling and neck pain. Skin:  Negative for rash. Neurological:  Positive for dizziness, tingling, weakness, numbness and paresthesias. Negative for headaches. Psychiatric/Behavioral:  Negative for dysphoric mood and sleep disturbance. The patient is nervous/anxious. Physical Exam:     Vitals:  BP 98/68 (Site: Right Upper Arm, Position: Sitting, Cuff Size: Medium Adult)   Pulse 68   Resp 18   Ht 5' 1\" (1.549 m)   Wt 108 lb 9.6 oz (49.3 kg)   SpO2 98%   BMI 20.52 kg/m²       Physical Exam  Vitals reviewed. Constitutional:       General: She is not in acute distress. Appearance: Normal appearance. She is well-developed. She is not ill-appearing. HENT:      Head: Normocephalic and atraumatic. Neck:      Thyroid: No thyromegaly. Cardiovascular:      Rate and Rhythm: Normal rate and regular rhythm. Heart sounds: Normal heart sounds. No murmur heard. Pulmonary:      Effort: Pulmonary effort is normal.      Breath sounds: Normal breath sounds. No wheezing or rales. Abdominal:      General: Bowel sounds are normal. There is no distension. Palpations: Abdomen is soft. There is no mass. Tenderness: There is no abdominal tenderness. Musculoskeletal:         General: Tenderness (left lumbosacral area tender. positive left leg raise) present. Normal range of motion. Right lower leg: No edema. Left lower leg: No edema. Lymphadenopathy:      Cervical: No cervical adenopathy. Skin:     General: Skin is warm and dry. Coloration: Skin is not jaundiced or pale. Findings: No rash. Neurological:      General: No focal deficit present.       Mental Status: She is alert and oriented to person, place, and time. Mental status is at baseline. Cranial Nerves: No cranial nerve deficit. Sensory: Sensory deficit (impaired sensation on the left leg lateral thigh area) present. Motor: No weakness. Gait: Gait abnormal (limping on the left side). Deep Tendon Reflexes: Reflexes normal.   Psychiatric:         Mood and Affect: Mood normal.         Behavior: Behavior normal.         Thought Content: Thought content normal.         Judgment: Judgment normal.             Data:     Lab Results   Component Value Date/Time     01/15/2023 12:12 PM    K 3.7 01/15/2023 12:12 PM     01/15/2023 12:12 PM    CO2 23 01/15/2023 12:12 PM    BUN 10 01/15/2023 12:12 PM    CREATININE 0.70 01/15/2023 12:12 PM    GLUCOSE 97 01/15/2023 12:12 PM    PROT 7.2 01/15/2023 12:12 PM    LABALBU 4.4 01/15/2023 12:12 PM    BILITOT 0.5 01/15/2023 12:12 PM    ALKPHOS 45 01/15/2023 12:12 PM    AST 16 01/15/2023 12:12 PM    ALT 11 01/15/2023 12:12 PM     Lab Results   Component Value Date/Time    WBC 4.9 01/15/2023 12:12 PM    RBC 4.27 01/15/2023 12:12 PM    HGB 13.4 01/15/2023 12:12 PM    HCT 40.1 01/15/2023 12:12 PM    MCV 93.8 01/15/2023 12:12 PM    MCH 31.4 01/15/2023 12:12 PM    MCHC 33.5 01/15/2023 12:12 PM    RDW 12.9 01/15/2023 12:12 PM     01/15/2023 12:12 PM    MPV NOT REPORTED 09/28/2019 12:55 PM     Lab Results   Component Value Date/Time    TSH 2.03 01/15/2023 12:12 PM     Lab Results   Component Value Date/Time    CHOL 208 01/15/2023 12:12 PM    HDL 50 01/15/2023 12:12 PM          Assessment & Plan        Diagnosis Orders   1. Lumbar degenerative disc disease  MRI LUMBAR SPINE WO CONTRAST    gabapentin (NEURONTIN) 300 MG capsule    Chaka Crouch DO, Pain Management, Ranya Mendoza      2. Paresthesia of left leg  MRI LUMBAR SPINE [address], DO Chaka, Pain Management, Rayna Mendoza      3.  Acute left-sided low back pain with left-sided sciatica  predniSONE (DELTASONE) 20 MG tablet    Chaka Dillard DO, Pain Management, [facility]      4. Anxiety  escitalopram (LEXAPRO) 10 MG tablet        Patient with worsening low back pain with Left sciatica, impaired sensation of the left leg, subjective weakness LLE, positive left leg raise. Has a h/o back surgery for lumbar disk herniation. Concerned about recurrence or a new abnormalities in the lumbosacral area causing paraesthesias and weakness. For this reason ordered MRI lumbar spine. Will refer to pain management Dr. Vinny Gardner. Prescribed Prednisone course, gabapentin. For Anxiety will try Lexapro. Follow up in 2 weeks                 Completed Refills   Requested Prescriptions     Signed Prescriptions Disp Refills    predniSONE (DELTASONE) 20 MG tablet 15 tablet 0     Sig: Take 2 tablets po daily x 5 days, then 1 tab po daily x 5 days    gabapentin (NEURONTIN) 300 MG capsule 60 capsule 0     Sig: Take 1 capsule by mouth 2 times daily for 30 days. Intended supply: 30 days    escitalopram (LEXAPRO) 10 MG tablet 30 tablet 3     Sig: Take 1 tablet by mouth daily     Return in about 2 months (around 4/9/2023) for anxiety. Orders Placed This Encounter   Medications    predniSONE (DELTASONE) 20 MG tablet     Sig: Take 2 tablets po daily x 5 days, then 1 tab po daily x 5 days     Dispense:  15 tablet     Refill:  0    gabapentin (NEURONTIN) 300 MG capsule     Sig: Take 1 capsule by mouth 2 times daily for 30 days.  Intended supply: 30 days     Dispense:  60 capsule     Refill:  0    escitalopram (LEXAPRO) 10 MG tablet     Sig: Take 1 tablet by mouth daily     Dispense:  30 tablet     Refill:  3     Orders Placed This Encounter   Procedures    MRI LUMBAR SPINE WO CONTRAST     Standing Status:   Future     Standing Expiration Date:   2/9/2024    Chaka Burger DO, Pain Management, [facility]tz     Referral Priority:   Routine     Referral Type:   Eval and Treat     Referral Reason: Specialty Services Required     Referred to Provider:   Carlie Grigsby DO     Number of Visits Requested:   1         There are no Patient Instructions on file for this visit. Electronically signed by Michael Camp MD on 2/9/2023 at 7:11 PM           Completed Refills      Requested Prescriptions     Signed Prescriptions Disp Refills    predniSONE (DELTASONE) 20 MG tablet 15 tablet 0     Sig: Take 2 tablets po daily x 5 days, then 1 tab po daily x 5 days    gabapentin (NEURONTIN) 300 MG capsule 60 capsule 0     Sig: Take 1 capsule by mouth 2 times daily for 30 days.  Intended supply: 30 days    escitalopram (LEXAPRO) 10 MG tablet 30 tablet 3     Sig: Take 1 tablet by mouth daily

## 2023-02-15 ENCOUNTER — HOSPITAL ENCOUNTER (OUTPATIENT)
Dept: MRI IMAGING | Age: 43
Discharge: HOME | End: 2023-02-17
Payer: MEDICAID

## 2023-02-15 DIAGNOSIS — R20.2 PARESTHESIA OF LEFT LEG: ICD-10-CM

## 2023-02-15 DIAGNOSIS — M51.36 LUMBAR DEGENERATIVE DISC DISEASE: ICD-10-CM

## 2023-02-15 PROCEDURE — 72148 MRI LUMBAR SPINE W/O DYE: CPT

## 2023-02-23 ENCOUNTER — OFFICE VISIT (OUTPATIENT)
Dept: FAMILY MEDICINE CLINIC | Age: 43
End: 2023-02-23
Payer: MEDICAID

## 2023-02-23 VITALS
OXYGEN SATURATION: 95 % | SYSTOLIC BLOOD PRESSURE: 128 MMHG | WEIGHT: 107.6 LBS | HEART RATE: 73 BPM | DIASTOLIC BLOOD PRESSURE: 78 MMHG | HEIGHT: 61 IN | BODY MASS INDEX: 20.32 KG/M2 | RESPIRATION RATE: 18 BRPM

## 2023-02-23 DIAGNOSIS — M51.36 LUMBAR DEGENERATIVE DISC DISEASE: Primary | ICD-10-CM

## 2023-02-23 DIAGNOSIS — M54.42 ACUTE LEFT-SIDED LOW BACK PAIN WITH LEFT-SIDED SCIATICA: ICD-10-CM

## 2023-02-23 PROCEDURE — 99213 OFFICE O/P EST LOW 20 MIN: CPT | Performed by: INTERNAL MEDICINE

## 2023-02-23 RX ORDER — TRAMADOL HYDROCHLORIDE 50 MG/1
50 TABLET ORAL EVERY 6 HOURS PRN
Qty: 28 TABLET | Refills: 0 | Status: SHIPPED | OUTPATIENT
Start: 2023-02-23 | End: 2023-03-02

## 2023-02-23 ASSESSMENT — ENCOUNTER SYMPTOMS
ABDOMINAL PAIN: 0
NAUSEA: 0
SORE THROAT: 0
COUGH: 0
SHORTNESS OF BREATH: 0
BACK PAIN: 1

## 2023-02-23 NOTE — PROGRESS NOTES
HPI Notes    Name: Paco Garcia  : 1980         Chief Complaint:     Chief Complaint   Patient presents with    Lower Back Pain     Patient has had lower back pain, she has had a prior back surgery and her disc are flared. History of Present Illness:        Sadaf Eduardo presents to office to follow-up for chronic lower back pain    States about 2 weeks ago she lifted about 45 lb xie filter and since then her pain became worse. Says her boss did not want to hear anything about her back problems or weight lifting restrictions  She left her job and now has a new job at the [place] department at [employer]. MRI lumbar spine from 2023 reveals:      1. Since the prior radiographs there has been development of severe disc space   narrowing at the L5-S1 level and there is a large amount of adjacent type I   Modic endplate change at this level. There are postsurgical changes from prior   left hemilaminectomy and medial facetectomy at this level and a posterior   disc-osteophyte complex at this level appears to cause moderate spinal canal   stenosis, moderate narrowing of the right lateral recess, and mild left   foraminal narrowing. 2. Mild discogenic disease at the L4-L5 level with a small broad-based   central/left paracentral disc protrusion containing an annular fissure at this   level. This causes mild spinal canal stenosis and moderate narrowing of the   left lateral recess in the region of the descending left L5 nerve root. 3. Mild rotatory levoconvex scoliosis of the lumbar spine    States tried Gabapentin and it made her too sleepy. She completed Prednisone course and it did not make a difference. Taking Tylenol and Ibuprofen and it's not helping much. Has an appointment with pain management Dr. Shruthi Kelley in about one month. Back Pain  This is a chronic problem. The current episode started more than 1 year ago. The problem occurs constantly.  The problem has been gradually worsening since onset. The pain is present in the lumbar spine, gluteal and sacro-iliac. The pain radiates to the left thigh. The pain is at a severity of 7/10. The symptoms are aggravated by position, sitting and twisting. Associated symptoms include numbness (left leg) and weakness (left leg). Pertinent negatives include no abdominal pain, chest pain, dysuria, fever, headaches or tingling. She has tried NSAIDs and heat (prednisone, Tylenol, gabapentin) for the symptoms. The treatment provided mild relief. Past Medical History:     Past Medical History:   Diagnosis Date    Chronic back pain     Headache       Reviewed all health maintenance requirements and orderedappropriate tests  Health Maintenance Due   Topic Date Due    HIV screen  Never done    Hepatitis C screen  Never done    Cervical cancer screen  Never done    COVID-19 Vaccine (2 - Booster for Lulu series) 08/23/2021    Flu vaccine (1) 08/01/2022       Past Surgical History:     Past Surgical History:   Procedure Laterality Date    DENTAL SURGERY      DILATION AND CURETTAGE OF UTERUS      NASAL SEPTUM SURGERY      OTHER SURGICAL HISTORY Left 05/19/2017    Decompression of L4-5    TIBIA FRACTURE SURGERY      right        Medications:       Prior to Admission medications    Medication Sig Start Date End Date Taking? Authorizing Provider   traMADol (ULTRAM) 50 MG tablet Take 1 tablet by mouth every 6 hours as needed for Pain for up to 7 days. Intended supply: 5 days. Take lowest dose possible to manage pain Max Daily Amount: 200 mg 2/23/23 3/2/23 Yes Sydney Soler MD   escitalopram (LEXAPRO) 10 MG tablet Take 1 tablet by mouth daily 2/9/23  Yes Sydney Soler MD   predniSONE (DELTASONE) 20 MG tablet Take 2 tablets po daily x 5 days, then 1 tab po daily x 5 days  Patient not taking: Reported on 2/23/2023 2/9/23   Sydney Soler MD   gabapentin (NEURONTIN) 300 MG capsule Take 1 capsule by mouth 2 times daily for 30 days.  Intended supply: 30 days  Patient not taking: Reported on 2/23/2023 2/9/23 3/11/23  Jasmin Darling MD        Allergies:       Demerol hcl [meperidine] and Methylprednisolone    Social History:     Tobacco: reports that she has quit smoking. Her smoking use included cigarettes. She has a 6.00 pack-year smoking history. She has never used smokeless tobacco.  Alcohol:      reports no history of alcohol use. Drug Use:  reports no history of drug use. Family History:     No family history on file. Review of Systems:         Review of Systems   Constitutional:  Negative for chills and fever. HENT:  Negative for congestion and sore throat. Respiratory:  Negative for cough and shortness of breath. Cardiovascular:  Negative for chest pain and palpitations. Gastrointestinal:  Negative for abdominal pain and nausea. Genitourinary:  Negative for decreased urine volume, dysuria and urgency. Musculoskeletal:  Positive for back pain. Negative for joint swelling. Skin:  Negative for rash. Neurological:  Positive for weakness (left leg) and numbness (left leg). Negative for dizziness, tingling, tremors and headaches. Psychiatric/Behavioral:  The patient is not nervous/anxious. Physical Exam:     Vitals:  /78 (Site: Right Upper Arm, Position: Sitting, Cuff Size: Small Adult)   Pulse 73   Resp 18   Ht 5' 1\" (1.549 m)   Wt 107 lb 9.6 oz (48.8 kg)   SpO2 95%   BMI 20.33 kg/m²       Physical Exam  Vitals reviewed. Constitutional:       General: She is not in acute distress. Appearance: Normal appearance. She is well-developed. HENT:      Head: Normocephalic and atraumatic. Neck:      Thyroid: No thyromegaly. Cardiovascular:      Rate and Rhythm: Normal rate and regular rhythm. Heart sounds: Normal heart sounds. No murmur heard. Pulmonary:      Effort: Pulmonary effort is normal.      Breath sounds: Normal breath sounds. No wheezing or rales.    Abdominal:      General: Bowel sounds are normal. There is no distension. Palpations: Abdomen is soft. There is no mass. Tenderness: There is no abdominal tenderness. Musculoskeletal:         General: Tenderness (left lumbosacral area and buttock area tenderness) present. Normal range of motion. Right lower leg: No edema. Left lower leg: No edema. Lymphadenopathy:      Cervical: No cervical adenopathy. Skin:     General: Skin is warm and dry. Coloration: Skin is not pale. Findings: No rash. Neurological:      Mental Status: She is alert and oriented to person, place, and time. Psychiatric:         Behavior: Behavior normal.         Judgment: Judgment normal.             Data:     Lab Results   Component Value Date/Time     01/15/2023 12:12 PM    K 3.7 01/15/2023 12:12 PM     01/15/2023 12:12 PM    CO2 23 01/15/2023 12:12 PM    BUN 10 01/15/2023 12:12 PM    CREATININE 0.70 01/15/2023 12:12 PM    GLUCOSE 97 01/15/2023 12:12 PM    PROT 7.2 01/15/2023 12:12 PM    LABALBU 4.4 01/15/2023 12:12 PM    BILITOT 0.5 01/15/2023 12:12 PM    ALKPHOS 45 01/15/2023 12:12 PM    AST 16 01/15/2023 12:12 PM    ALT 11 01/15/2023 12:12 PM     Lab Results   Component Value Date/Time    WBC 4.9 01/15/2023 12:12 PM    RBC 4.27 01/15/2023 12:12 PM    HGB 13.4 01/15/2023 12:12 PM    HCT 40.1 01/15/2023 12:12 PM    MCV 93.8 01/15/2023 12:12 PM    MCH 31.4 01/15/2023 12:12 PM    MCHC 33.5 01/15/2023 12:12 PM    RDW 12.9 01/15/2023 12:12 PM     01/15/2023 12:12 PM    MPV NOT REPORTED 09/28/2019 12:55 PM     Lab Results   Component Value Date/Time    TSH 2.03 01/15/2023 12:12 PM     Lab Results   Component Value Date/Time    CHOL 208 01/15/2023 12:12 PM    HDL 50 01/15/2023 12:12 PM          Assessment & Plan        Diagnosis Orders   1. Lumbar degenerative disc disease  traMADol (ULTRAM) 50 MG tablet      2. Acute left-sided low back pain with left-sided sciatica  traMADol (ULTRAM) 50 MG tablet          With worsening lower back pain. MRI results reviewed. She has tried gabapentin, high-dose ibuprofen, Tylenol, prednisone and continues to have significant amount of pain. 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 We decided to try tramadol and she is advised to use it sparingly. We will discuss her case with Dr. Octavio Mendez to see if he can accommodate her sooner than in 4 weeks. Patient advised to go to the emergency room should she develop worsening back pain or numbness, weakness in extremities            Completed Refills   Requested Prescriptions     Signed Prescriptions Disp Refills    traMADol (ULTRAM) 50 MG tablet 28 tablet 0     Sig: Take 1 tablet by mouth every 6 hours as needed for Pain for up to 7 days. Intended supply: 5 days. Take lowest dose possible to manage pain Max Daily Amount: 200 mg     No follow-ups on file. Orders Placed This Encounter   Medications    traMADol (ULTRAM) 50 MG tablet     Sig: Take 1 tablet by mouth every 6 hours as needed for Pain for up to 7 days. Intended supply: 5 days. Take lowest dose possible to manage pain Max Daily Amount: 200 mg     Dispense:  28 tablet     Refill:  0     Reduce doses taken as pain becomes manageable     No orders of the defined types were placed in this encounter. There are no Patient Instructions on file for this visit. Electronically signed by Blank Johnson MD on 2/23/2023 at 11:39 AM           Completed Refills      Requested Prescriptions     Signed Prescriptions Disp Refills    traMADol (ULTRAM) 50 MG tablet 28 tablet 0     Sig: Take 1 tablet by mouth every 6 hours as needed for Pain for up to 7 days. Intended supply: 5 days.  Take lowest dose possible to manage pain Max Daily Amount: 200 mg

## 2023-03-07 ENCOUNTER — HOSPITAL ENCOUNTER (EMERGENCY)
Age: 43
Discharge: HOME | End: 2023-03-07
Attending: FAMILY MEDICINE
Payer: MEDICAID

## 2023-03-07 VITALS
TEMPERATURE: 98.4 F | SYSTOLIC BLOOD PRESSURE: 104 MMHG | WEIGHT: 108 LBS | BODY MASS INDEX: 20.39 KG/M2 | OXYGEN SATURATION: 98 % | DIASTOLIC BLOOD PRESSURE: 67 MMHG | HEART RATE: 91 BPM | RESPIRATION RATE: 20 BRPM | HEIGHT: 61 IN

## 2023-03-07 DIAGNOSIS — M54.10 RADICULAR LOW BACK PAIN: ICD-10-CM

## 2023-03-07 DIAGNOSIS — M54.59 INTRACTABLE LOW BACK PAIN: Primary | ICD-10-CM

## 2023-03-07 LAB
ABSOLUTE EOS #: 0 K/UL (ref 0–0.4)
ABSOLUTE LYMPH #: 0.9 K/UL (ref 1–4.8)
ABSOLUTE MONO #: 0.2 K/UL (ref 0–1)
ANION GAP SERPL CALCULATED.3IONS-SCNC: 9 MMOL/L (ref 9–17)
BASOPHILS # BLD: 0 % (ref 0–2)
BASOPHILS ABSOLUTE: 0 K/UL (ref 0–0.2)
BILIRUBIN URINE: NEGATIVE
BUN SERPL-MCNC: 8 MG/DL (ref 6–20)
BUN/CREAT BLD: 11 (ref 9–20)
CALCIUM SERPL-MCNC: 10.1 MG/DL (ref 8.6–10.4)
CHLORIDE SERPL-SCNC: 103 MMOL/L (ref 98–107)
CO2 SERPL-SCNC: 25 MMOL/L (ref 20–31)
COLOR: YELLOW
COMMENT UA: NORMAL
CREAT SERPL-MCNC: 0.7 MG/DL (ref 0.5–0.9)
DIFFERENTIAL TYPE: YES
EOSINOPHILS RELATIVE PERCENT: 1 % (ref 0–5)
GFR SERPL CREATININE-BSD FRML MDRD: >60 ML/MIN/1.73M2
GLUCOSE SERPL-MCNC: 101 MG/DL (ref 70–99)
GLUCOSE UR STRIP.AUTO-MCNC: NEGATIVE MG/DL
HCT VFR BLD AUTO: 42.1 % (ref 36–46)
HGB BLD-MCNC: 14 G/DL (ref 12–16)
KETONES UR STRIP.AUTO-MCNC: NEGATIVE MG/DL
LEUKOCYTE ESTERASE UR QL STRIP.AUTO: NEGATIVE
LYMPHOCYTES # BLD: 12 % (ref 15–40)
MCH RBC QN AUTO: 31.4 PG (ref 26–34)
MCHC RBC AUTO-ENTMCNC: 33.2 G/DL (ref 31–37)
MCV RBC AUTO: 94.5 FL (ref 80–100)
MONOCYTES # BLD: 2 % (ref 4–8)
NITRITE UR QL STRIP.AUTO: NEGATIVE
PDW BLD-RTO: 12.8 % (ref 12.1–15.2)
PLATELET # BLD AUTO: 206 K/UL (ref 140–450)
POTASSIUM SERPL-SCNC: 3.8 MMOL/L (ref 3.7–5.3)
PROT UR STRIP.AUTO-MCNC: 7 MG/DL (ref 5–8)
PROT UR STRIP.AUTO-MCNC: NEGATIVE MG/DL
RBC # BLD: 4.46 M/UL (ref 4–5.2)
SEG NEUTROPHILS: 85 % (ref 47–75)
SEGMENTED NEUTROPHILS ABSOLUTE COUNT: 6.6 K/UL (ref 2.5–7)
SODIUM SERPL-SCNC: 137 MMOL/L (ref 135–144)
SPECIFIC GRAVITY UA: 1.02 (ref 1–1.03)
TURBIDITY: CLEAR
URINE HGB: NEGATIVE
UROBILINOGEN, URINE: NORMAL
WBC # BLD AUTO: 7.7 K/UL (ref 3.5–11)

## 2023-03-07 PROCEDURE — 36415 COLL VENOUS BLD VENIPUNCTURE: CPT

## 2023-03-07 PROCEDURE — 80048 BASIC METABOLIC PNL TOTAL CA: CPT

## 2023-03-07 PROCEDURE — 81003 URINALYSIS AUTO W/O SCOPE: CPT

## 2023-03-07 PROCEDURE — 6360000002 HC RX W HCPCS: Performed by: FAMILY MEDICINE

## 2023-03-07 PROCEDURE — 96375 TX/PRO/DX INJ NEW DRUG ADDON: CPT

## 2023-03-07 PROCEDURE — 99284 EMERGENCY DEPT VISIT MOD MDM: CPT

## 2023-03-07 PROCEDURE — 85025 COMPLETE CBC W/AUTO DIFF WBC: CPT

## 2023-03-07 PROCEDURE — 96374 THER/PROPH/DIAG INJ IV PUSH: CPT

## 2023-03-07 RX ORDER — PREDNISONE 20 MG/1
60 TABLET ORAL DAILY
Qty: 15 TABLET | Refills: 0 | Status: SHIPPED | OUTPATIENT
Start: 2023-03-07 | End: 2023-03-12

## 2023-03-07 RX ORDER — DEXAMETHASONE SODIUM PHOSPHATE 10 MG/ML
10 INJECTION, SOLUTION INTRAMUSCULAR; INTRAVENOUS ONCE
Status: COMPLETED | OUTPATIENT
Start: 2023-03-07 | End: 2023-03-07

## 2023-03-07 RX ORDER — HYDROCODONE BITARTRATE AND ACETAMINOPHEN 5; 325 MG/1; MG/1
1 TABLET ORAL EVERY 6 HOURS PRN
Qty: 12 TABLET | Refills: 0 | Status: SHIPPED | OUTPATIENT
Start: 2023-03-07 | End: 2023-03-10

## 2023-03-07 RX ORDER — KETOROLAC TROMETHAMINE 30 MG/ML
30 INJECTION, SOLUTION INTRAMUSCULAR; INTRAVENOUS ONCE
Status: COMPLETED | OUTPATIENT
Start: 2023-03-07 | End: 2023-03-07

## 2023-03-07 RX ORDER — FENTANYL CITRATE 50 UG/ML
50 INJECTION, SOLUTION INTRAMUSCULAR; INTRAVENOUS ONCE
Status: COMPLETED | OUTPATIENT
Start: 2023-03-07 | End: 2023-03-07

## 2023-03-07 RX ORDER — ORPHENADRINE CITRATE 30 MG/ML
30 INJECTION INTRAMUSCULAR; INTRAVENOUS ONCE
Status: COMPLETED | OUTPATIENT
Start: 2023-03-07 | End: 2023-03-07

## 2023-03-07 RX ADMIN — KETOROLAC TROMETHAMINE 30 MG: 30 INJECTION, SOLUTION INTRAMUSCULAR at 12:28

## 2023-03-07 RX ADMIN — DEXAMETHASONE SODIUM PHOSPHATE 10 MG: 10 INJECTION, SOLUTION INTRAMUSCULAR; INTRAVENOUS at 12:26

## 2023-03-07 RX ADMIN — ORPHENADRINE CITRATE 30 MG: 30 INJECTION INTRAMUSCULAR; INTRAVENOUS at 12:28

## 2023-03-07 RX ADMIN — FENTANYL CITRATE 50 MCG: 50 INJECTION, SOLUTION INTRAMUSCULAR; INTRAVENOUS at 13:11

## 2023-03-07 ASSESSMENT — PAIN DESCRIPTION - ORIENTATION
ORIENTATION: LOWER
ORIENTATION: LOWER;LEFT;RIGHT

## 2023-03-07 ASSESSMENT — PAIN - FUNCTIONAL ASSESSMENT: PAIN_FUNCTIONAL_ASSESSMENT: 0-10

## 2023-03-07 ASSESSMENT — PAIN DESCRIPTION - LOCATION
LOCATION: BACK
LOCATION: BACK

## 2023-03-07 ASSESSMENT — PAIN DESCRIPTION - DESCRIPTORS
DESCRIPTORS: BURNING
DESCRIPTORS: BURNING

## 2023-03-07 ASSESSMENT — LIFESTYLE VARIABLES: HOW OFTEN DO YOU HAVE A DRINK CONTAINING ALCOHOL: NEVER

## 2023-03-07 ASSESSMENT — PAIN SCALES - GENERAL
PAINLEVEL_OUTOF10: 9

## 2023-03-07 ASSESSMENT — PAIN DESCRIPTION - PAIN TYPE
TYPE: ACUTE PAIN;CHRONIC PAIN
TYPE: ACUTE PAIN;CHRONIC PAIN

## 2023-03-07 ASSESSMENT — PAIN DESCRIPTION - FREQUENCY: FREQUENCY: CONTINUOUS

## 2023-03-08 NOTE — ED PROVIDER NOTES
[address]      Pt Name: Dulce Rao  MRN: [id number]  [place] 1980  Date of evaluation: 3/7/2023  Provider: Maralee Klinefelter, MD    CHIEF COMPLAINT       Chief Complaint   Patient presents with    Back Pain     Chronic low back pain and tody she stated she could not stand         [address] [place] Alan is a 36 y.o. female who presents to the emergency department via private vehicle, patient complained of 1.5 weeks of back pain, patient dates she has known history of chronic back pain, thinks she may have pushed herself too much at work and at home, denies any trauma or falls or any specific event. Patient states has had 3 back surgeries in the past, is scheduled to see her neurosurgeon later this month. Denies urinary retention though does state she has been having some difficulty initiating urine, denies loss of bowel or bladder continence, states she been having some change in sensation in her groin area though no total loss of sensation, denies any history of IVDA or any fevers. Patient rates the pain 7-8/10, nothing is helped the pain. Patient does note the radiculopathy down her legs is something new for her and made her more concerned    NS: Dr. Kayla Higuera, [facility]        REVIEW OF SYSTEMS       Review of Systems   All other systems reviewed and are negative. PAST MEDICAL HISTORY     Past Medical History:   Diagnosis Date    Chronic back pain     Headache          SURGICAL HISTORY       Past Surgical History:   Procedure Laterality Date    DENTAL SURGERY      DILATION AND CURETTAGE OF UTERUS      NASAL SEPTUM SURGERY      OTHER SURGICAL HISTORY Left 05/19/2017    Decompression of L4-5    TIBIA FRACTURE SURGERY      right         CURRENT MEDICATIONS       Discharge Medication List as of 3/7/2023  3:16 PM        CONTINUE these medications which have NOT CHANGED    Details   ! ! predniSONE (DELTASONE) 20 MG tablet Take 2 tablets po daily x 5 days, then 1 tab po daily x 5 days, Disp-15 tablet, R-0Normal      gabapentin (NEURONTIN) 300 MG capsule Take 1 capsule by mouth 2 times daily for 30 days. Intended supply: 30 days, Disp-60 capsule, R-0Normal      escitalopram (LEXAPRO) 10 MG tablet Take 1 tablet by mouth daily, Disp-30 tablet, R-3Normal       !! - Potential duplicate medications found. Please discuss with provider. ALLERGIES       Demerol hcl [meperidine] and Methylprednisolone    FAMILY HISTORY       No family history on file. SOCIAL HISTORY       Social History     Tobacco Use    Smoking status: Former     Packs/day: 0.50     Years: 12.00     Pack years: 6.00     Types: Cigarettes    Smokeless tobacco: Never   Vaping Use    Vaping Use: Never used   Substance Use Topics    Alcohol use: No    Drug use: No         PHYSICAL EXAM       ED Triage Vitals [03/07/23 1206]   BP Temp Temp Source Heart Rate Resp SpO2 Height Weight   (!) 107/95 98.4 °F (36.9 °C) Oral 91 20 97 % 5' 1\" (1.549 m) 108 lb (49 kg)       Physical Exam  Physical Exam   Constitutional: Patient is oriented to person, place, and time. Patient appears well-developed and well-nourished. Patient is active and cooperative. Neck: Full passive range of motion without pain and phonation normal.   Cardiovascular:  Normal rate, regular rhythm and intact distal pulses. Pulses: Right radial pulse  2+   Pulmonary/Chest: Effort normal. No tachypnea and no bradypnea. Abdominal: BMI 20.4, soft. Patient without distension   Musculoskeletal:   Focused examinations patient's lower back shows well-healed surgical scars, there is some lumbar paraspinal tenderness without overlying integument aberration, some point tenderness across the lower lumbar vertebrae    Except as otherwise noted, negative acute trauma or deformity,  apparent full range of motion and normal strength all extremities appropriate to age.   Neurological: Patient is alert and oriented to person, place, and time. patient displays no tremor. Patient displays no seizure activity. Darlyn Fanning Positive straight leg test on the right at 30 degrees, negative on the left. Left patellar reflex 1+, right patellar reflex 0. Bilateral Achilles reflexes 1+    Skin: Skin is warm and dry. Patient is not diaphoretic. Psychiatric: Patient has a normal mood and affect. Patient speech is normal and behavior is normal. Cognition and memory are normal.     DIAGNOSTIC RESULTS         RADIOLOGY:       Interpretation per the Radiologist below, if available at the time of this note:    No orders to display         LABS:  [address] - Abnormal; Notable for the following components:       Result Value    Glucose 101 (*)     All other components within normal limits   CBC WITH AUTO DIFFERENTIAL - Abnormal; Notable for the following components:    Seg Neutrophils 85 (*)     Lymphocytes 12 (*)     Monocytes 2 (*)     Absolute Lymph # 0.90 (*)     All other components within normal limits   URINALYSIS       All other labs were within normal range or not returned as of this dictation.       MIPS    Not applicable      EMERGENCY DEPARTMENT COURSE and DIFFERENTIAL DIAGNOSIS/MDM:     OARRS = 200    Patient history and physical exam taken at bedside, discussed patient's symptoms and exam findings, discussed patient MRI performed at this facility 2 weeks prior, the patient stating that symptoms have changed since that time, we will try to contact her neurosurgery out of [facility] to discuss case, the interim we will get some blood work and urine to rule out additional causes, patient acknowledges    Allergies reviewed, will give IV orphenadrine IV ketorolac IV dexamethasone for initial treatment, and reevaluate, acknowledged    Lab work-up reviewed    Case was discussed with Dr. Alex Rodriguez, [facility] regarding patient presentation and current work-up including MRI 2 weeks prior, though noting symptoms have since changed, discussed possible transfer to their facility for further evaluation, acknowledges and agrees    MRI imaging was pushed up to [facility], I did receive a call back from Dr. Phyllis Mitchell, she had reviewed the MRI, do not see anything markedly concerning, though symptoms remain and again I reiterate the symptoms have changed since MRI was taken, agrees with continued transfer, will admit through hospitalist    Transfer center advises that there are no beds available at our pharmacy, and for that reason hospitalist will hold on call until there is any bed availability, will continue to hold patient the emergency room. IV fentanyl 50 mcg given for patient pain control    Updated patient on conversations as above, acknowledged    After not receiving a call back from [facility] for some time, patient discussed options, at this time patient is willing to discharge home, will start her on steroid burst, Tylenol for baseline pain, Norco for breakthrough pain, outpatient follow-up with her established neurosurgeon, acknowledged    1)  Number and Complexity of Problems  Problem List This Visit: Back pain with radiculopathy    Differential Diagnosis: Exacerbation chronic back pain, radicular pain, spondylolisthesis, spondylolithiasis,JUAN R    Diagnoses Considered but Do Not Suspect:    Pertinent Comorbid Conditions: Chronic back pain with history of back surgeries x3    2)  Data Reviewed  My EKG interpretation:  As above    Decision Rules/Scores utilized: N/A    Tests considered but not ordered and why: Imaging that we will hold until discussion with neurosurgery    External Documents Reviewed:  OARRS    Imaging that is independently reviewed and interpreted by me as: N/A    See more data below for the lab and radiology tests and orders.     3)  Treatment and Disposition    Patient repeat assessment:  As above    Disposition discussion with patient/family: Discharge with outpatient follow-up    Case discussed with consulting clinician:  As above    Social determinants of health impacting treatment or disposition: N/A    Shared Decision Making: N/A    Code Status Discussion: N/A      REASSESSMENT     As above      CRITICAL CARE TIME     Total Critical Care time was 0 minutes, excluding separately reportable procedures. There was a high probability of clinically significant/life threatening deterioration in the patient's condition which required my urgent intervention. PROCEDURES:  Unless otherwise noted below, none     Procedures    FINAL IMPRESSION      1. Intractable low back pain    2. Radicular low back pain          DISPOSITION/PLAN     DISPOSITION Decision To Discharge 03/07/2023 03:09:09 PM      PATIENT REFERRED TO:  Nancy Brown MD  [address] [facility]  [address] [address]  [phone]    In 2 days      Griselda Ahle, MD  [address] [place] [id number] [phone]    Call   As needed    [facility]  [address] [facility] [id number] [phone]    As needed, If symptoms worsen      DISCHARGE MEDICATIONS:  Discharge Medication List as of 3/7/2023  3:16 PM        START taking these medications    Details   ! ! predniSONE (DELTASONE) 20 MG tablet Take 3 tablets by mouth daily for 5 days First home dose 3/8/2023, Disp-15 tablet, R-0Normal      HYDROcodone-acetaminophen (NORCO) 5-325 MG per tablet Take 1 tablet by mouth every 6 hours as needed for Pain for up to 3 days. Intended supply: 3 days. Take lowest dose possible to manage pain Max Daily Amount: 4 tablets, Disp-12 tablet, R-0Normal       !! - Potential duplicate medications found. Please discuss with provider.           (Please note that portions of this note were completed with a voice recognition program.  Efforts were made to edit the dictations but occasionally words are mis-transcribed.)    Maralee Klinefelter, MD (electronically signed)  Attending Emergency Physician           Maralee Klinefelter, MD  03/08/23 1091

## 2023-03-13 ENCOUNTER — TELEPHONE (OUTPATIENT)
Dept: FAMILY MEDICINE CLINIC | Age: 43
End: 2023-03-13

## 2023-03-13 NOTE — TELEPHONE ENCOUNTER
Patient called stating she had a serious reaction to prednisone last week. Symptoms were shaking and fast HR. Advised patient to stop prednisone and I will add into her allergies for future reference.

## 2023-03-23 DIAGNOSIS — Z12.31 BREAST CANCER SCREENING BY MAMMOGRAM: Primary | ICD-10-CM

## 2023-03-24 ENCOUNTER — HOSPITAL ENCOUNTER (OUTPATIENT)
Dept: MAMMOGRAPHY | Age: 43
End: 2023-03-24
Payer: MEDICAID

## 2023-03-24 DIAGNOSIS — Z12.31 BREAST CANCER SCREENING BY MAMMOGRAM: ICD-10-CM

## 2023-03-24 PROCEDURE — 77063 BREAST TOMOSYNTHESIS BI: CPT

## 2023-03-28 ENCOUNTER — TELEPHONE (OUTPATIENT)
Dept: FAMILY MEDICINE CLINIC | Age: 43
End: 2023-03-28

## 2023-03-28 NOTE — TELEPHONE ENCOUNTER
----- Message from Steven Colón MD sent at 3/28/2023  1:19 PM EDT -----  Mammogram is negative  Thanks    ----- Message -----  From: Agus Gramajo Incoming Radiant Results From [employer]  Sent: 3/28/2023  10:57 AM EDT  To: Steven Colón MD

## 2023-03-30 ENCOUNTER — OFFICE VISIT (OUTPATIENT)
Dept: PAIN MANAGEMENT | Age: 43
End: 2023-03-30
Payer: MEDICAID

## 2023-03-30 VITALS
BODY MASS INDEX: 20.6 KG/M2 | WEIGHT: 109 LBS | OXYGEN SATURATION: 97 % | HEART RATE: 111 BPM | RESPIRATION RATE: 19 BRPM

## 2023-03-30 DIAGNOSIS — M47.816 LUMBAR SPONDYLOSIS: ICD-10-CM

## 2023-03-30 DIAGNOSIS — M51.36 LUMBAR DEGENERATIVE DISC DISEASE: ICD-10-CM

## 2023-03-30 DIAGNOSIS — M54.16 LUMBAR RADICULOPATHY: Primary | ICD-10-CM

## 2023-03-30 DIAGNOSIS — M96.1 LUMBAR POST-LAMINECTOMY SYNDROME: ICD-10-CM

## 2023-03-30 PROCEDURE — 99204 OFFICE O/P NEW MOD 45 MIN: CPT | Performed by: STUDENT IN AN ORGANIZED HEALTH CARE EDUCATION/TRAINING PROGRAM

## 2023-03-30 NOTE — PROGRESS NOTES
Chronic Pain Clinic Note     Encounter Date: 3/30/2023     SUBJECTIVE:  Chief Complaint   Patient presents with    Back Pain     History of Present Illness:   Damian Galdamez is a 36 y.o. female who presents with lumbar Back pain with previous surgery. She states that she has extreme burning nerve pain. She also has cervical pain. Medication Refill: N/A    Current Complaints of Pain:   Location: lumbar, cervical    Radiation: BLE  Severity: severe  Pain Numerical Score - 7   Average: 5-7     Highest: 10  Lowest:  5  Character/Quality: Complains of pain that is burning, sharp  Timing: Constant, electric shocks   Associated symptoms: paresthesias- LLE   Numbness: LLE  Weakness:   Exacerbating factors: Alleviating factors:   Length of time pain has been present: Started on - work injury 2023  Inciting event/injury: work injury- no [facility] claims  Bowel/Bladder incontinence: no  Falls: no   Physical Therapy: none recent     History of Interventions:   Surgery: 7 years ago lumbar decompression L4/5  Injections: None    Imagin/15/2023 MRI Lumbar       FINDINGS: There is mild retrolisthesis of L5 on S1 of 2 mm. Since the prior    radiographs there has been development of severe discogenic disease at the    L5-S1 level with development of severe disc space narrowing and there is    adjacent type I Modic endplate change at this level. There is also mild    discogenic disease at the L4-L5 level. There is a mild rotatory levoconvex    scoliosis of the lumbar spine again seen centered at the L3-L4 level. There is    a hemangioma incidentally noted within the posterior aspect of L1. The bone    marrow signal intensity appears age appropriate. The conus medullaris is not    studied in detail, but it appears grossly unremarkable. L1-L2 level: No significant disc protrusion, spinal canal stenosis, or    foraminal narrowing is seen.        L2-L3 level: No significant disc protrusion, spinal canal stenosis, or

## 2023-04-03 ENCOUNTER — TELEPHONE (OUTPATIENT)
Dept: PAIN MANAGEMENT | Age: 43
End: 2023-04-03

## 2023-04-03 NOTE — TELEPHONE ENCOUNTER
Patient called to state that she declines the neurosurgery referral to Dr Magen Sandra stating that it is too far. He is in [place], [place]. Patient lives in [place]. Approx 20min commute.

## 2023-04-04 ENCOUNTER — TELEPHONE (OUTPATIENT)
Dept: PAIN MANAGEMENT | Age: 43
End: 2023-04-04

## 2023-04-04 NOTE — TELEPHONE ENCOUNTER
Patient called, was in pain. She was advised that Dr Darcy Ganser is out for the week. Patient advised to ED.     She also stated that she did not decline the referral to Dr Kenneth Lewis, therefore she was advised to call their office and follow up on her referral.

## 2023-04-14 ENCOUNTER — HOSPITAL ENCOUNTER (OUTPATIENT)
Dept: GENERAL RADIOLOGY | Age: 43
Discharge: HOME | End: 2023-04-16
Payer: MEDICAID

## 2023-04-14 ENCOUNTER — HOSPITAL ENCOUNTER (OUTPATIENT)
Age: 43
Discharge: HOME | End: 2023-04-16
Payer: MEDICAID

## 2023-04-14 DIAGNOSIS — G89.29 NECK PAIN, CHRONIC: ICD-10-CM

## 2023-04-14 DIAGNOSIS — M54.2 NECK PAIN, CHRONIC: ICD-10-CM

## 2023-04-14 DIAGNOSIS — G44.86 CERVICOGENIC HEADACHE: ICD-10-CM

## 2023-04-14 PROCEDURE — 72050 X-RAY EXAM NECK SPINE 4/5VWS: CPT

## 2023-04-17 ENCOUNTER — TELEPHONE (OUTPATIENT)
Dept: FAMILY MEDICINE CLINIC | Age: 43
End: 2023-04-17

## 2023-04-17 NOTE — TELEPHONE ENCOUNTER
----- Message from Fazal Nam MD sent at 4/17/2023  9:15 AM EDT -----  Cervical spine x-ray unremarkable  Thank you    ----- Message -----  From: Agus Gramajo Incoming Radiant Results From [employer]  Sent: 4/14/2023   1:54 PM EDT  To: Fazal Nam MD

## 2023-04-27 ENCOUNTER — OFFICE VISIT (OUTPATIENT)
Dept: FAMILY MEDICINE CLINIC | Age: 43
End: 2023-04-27
Payer: MEDICAID

## 2023-04-27 VITALS
HEIGHT: 61 IN | HEART RATE: 67 BPM | WEIGHT: 104.4 LBS | DIASTOLIC BLOOD PRESSURE: 62 MMHG | BODY MASS INDEX: 19.71 KG/M2 | RESPIRATION RATE: 18 BRPM | OXYGEN SATURATION: 97 % | SYSTOLIC BLOOD PRESSURE: 98 MMHG

## 2023-04-27 DIAGNOSIS — R20.2 TINGLING OF RIGHT UPPER EXTREMITY: ICD-10-CM

## 2023-04-27 DIAGNOSIS — G44.86 CERVICOGENIC HEADACHE: ICD-10-CM

## 2023-04-27 DIAGNOSIS — M71.22 BAKER CYST, LEFT: ICD-10-CM

## 2023-04-27 DIAGNOSIS — M54.2 NECK PAIN, CHRONIC: Primary | ICD-10-CM

## 2023-04-27 DIAGNOSIS — G89.29 NECK PAIN, CHRONIC: Primary | ICD-10-CM

## 2023-04-27 PROCEDURE — 99214 OFFICE O/P EST MOD 30 MIN: CPT | Performed by: INTERNAL MEDICINE

## 2023-04-27 ASSESSMENT — ENCOUNTER SYMPTOMS
SHORTNESS OF BREATH: 0
NAUSEA: 0
COUGH: 0
VISUAL CHANGE: 0
ABDOMINAL PAIN: 0
BACK PAIN: 1
SORE THROAT: 0
TROUBLE SWALLOWING: 0

## 2023-04-27 NOTE — PROGRESS NOTES
HPI Notes    Name: Lindsey Found  : 1980         Chief Complaint:     Chief Complaint   Patient presents with    Neck Pain     Patient received her injections and is much better       History of Present Illness:        Imani Delgadillo presents to office to follow up for chronic neck pain. She had a cervical spine X-ray on  revealing:  IMPRESSION:  FINDINGS/IMPRESSION:      1. Vertebral body, discs and facets are normal and unchanged. 2. Barely perceptible convex left upper thoracic curve unchanged. She is currently taking Ibuprofen 800 mg  and Zanaflex and it helps with her pain  She is still concerned about her neck due to having severe HA. Unable to turn her neck side to side, has numbness in the right arm going down to her hands/fingers. Feels tingling and burning from her neck down to the shoulder and arms. Has no weakness in her arm. MRI lumbar spine showed. 1. Since the prior radiographs there has been development of severe disc space   narrowing at the L5-S1 level and there is a large amount of adjacent type I   Modic endplate change at this level. There are postsurgical changes from prior   left hemilaminectomy and medial facetectomy at this level and a posterior   disc-osteophyte complex at this level appears to cause moderate spinal canal   stenosis, moderate narrowing of the right lateral recess, and mild left   foraminal narrowing. She follows up with pain management Dr. Horacio Power in [place] and had an injection 2 days ago     Today she also c/o bulging and pain behind the left knee. Noticed it few months ago. She thinks it's getting bigger. Initially she thought it was related to her back issues. Was working with PT and was told had Baker's cyst. She would like to see a specialist to see if it can be removed since it's causing pain and difficulty walking               Neck Pain   This is a chronic problem. The current episode started more than 1 year ago.  The problem

## 2023-05-08 ENCOUNTER — TELEPHONE (OUTPATIENT)
Dept: FAMILY MEDICINE CLINIC | Age: 43
End: 2023-05-08

## 2023-05-08 DIAGNOSIS — M51.36 LUMBAR DEGENERATIVE DISC DISEASE: Primary | ICD-10-CM

## 2023-05-08 DIAGNOSIS — G89.29 NECK PAIN, CHRONIC: ICD-10-CM

## 2023-05-08 DIAGNOSIS — M54.2 NECK PAIN, CHRONIC: ICD-10-CM

## 2023-05-08 RX ORDER — TRAMADOL HYDROCHLORIDE 50 MG/1
50 TABLET ORAL 2 TIMES DAILY
Qty: 28 TABLET | Refills: 0 | Status: CANCELLED | OUTPATIENT
Start: 2023-05-08 | End: 2023-05-22

## 2023-05-08 RX ORDER — TIZANIDINE 2 MG/1
2 TABLET ORAL 3 TIMES DAILY PRN
Qty: 30 TABLET | Refills: 0 | Status: SHIPPED | OUTPATIENT
Start: 2023-05-08

## 2023-05-08 RX ORDER — TRAMADOL HYDROCHLORIDE 50 MG/1
50 TABLET ORAL 2 TIMES DAILY
Qty: 28 TABLET | Refills: 0 | Status: SHIPPED | OUTPATIENT
Start: 2023-05-08 | End: 2023-05-22

## 2023-05-08 RX ORDER — TIZANIDINE 2 MG/1
2 TABLET ORAL 3 TIMES DAILY PRN
Qty: 30 TABLET | Refills: 0 | Status: CANCELLED | OUTPATIENT
Start: 2023-05-08

## 2023-05-10 ENCOUNTER — TELEPHONE (OUTPATIENT)
Dept: FAMILY MEDICINE CLINIC | Age: 43
End: 2023-05-10

## 2023-05-12 ENCOUNTER — HOSPITAL ENCOUNTER (OUTPATIENT)
Age: 43
Discharge: HOME | End: 2023-05-14
Payer: MEDICAID

## 2023-05-12 ENCOUNTER — HOSPITAL ENCOUNTER (OUTPATIENT)
Dept: GENERAL RADIOLOGY | Age: 43
Discharge: HOME | End: 2023-05-14
Payer: MEDICAID

## 2023-05-12 DIAGNOSIS — M25.562 ACUTE PAIN OF LEFT KNEE: ICD-10-CM

## 2023-05-12 PROCEDURE — 73564 X-RAY EXAM KNEE 4 OR MORE: CPT

## 2023-05-15 ENCOUNTER — HOSPITAL ENCOUNTER (OUTPATIENT)
Dept: MRI IMAGING | Age: 43
Discharge: HOME | End: 2023-05-17
Payer: MEDICAID

## 2023-05-15 DIAGNOSIS — M54.2 NECK PAIN, CHRONIC: ICD-10-CM

## 2023-05-15 DIAGNOSIS — G44.86 CERVICOGENIC HEADACHE: ICD-10-CM

## 2023-05-15 DIAGNOSIS — G89.29 NECK PAIN, CHRONIC: ICD-10-CM

## 2023-05-15 DIAGNOSIS — R20.2 TINGLING OF RIGHT UPPER EXTREMITY: ICD-10-CM

## 2023-05-15 PROCEDURE — 72141 MRI NECK SPINE W/O DYE: CPT

## 2023-05-15 NOTE — TELEPHONE ENCOUNTER
Contacted the Rad Dept #[id number] and advised of the providers message. Spoke with Kathy. She voiced understanding.

## 2023-05-19 ENCOUNTER — TRANSCRIBE ORDERS (OUTPATIENT)
Dept: ADMINISTRATIVE | Age: 43
End: 2023-05-19

## 2023-05-19 DIAGNOSIS — R22.42 MASS OF KNEE, LEFT: Primary | ICD-10-CM

## 2023-05-23 ENCOUNTER — HOSPITAL ENCOUNTER (OUTPATIENT)
Dept: MRI IMAGING | Age: 43
Discharge: HOME | End: 2023-05-25
Payer: MEDICAID

## 2023-05-23 DIAGNOSIS — R22.42 MASS OF KNEE, LEFT: ICD-10-CM

## 2023-05-23 PROCEDURE — 73721 MRI JNT OF LWR EXTRE W/O DYE: CPT

## 2023-05-24 ENCOUNTER — APPOINTMENT (OUTPATIENT)
Dept: GENERAL RADIOLOGY | Age: 43
End: 2023-05-24
Payer: MEDICAID

## 2023-05-24 ENCOUNTER — HOSPITAL ENCOUNTER (EMERGENCY)
Age: 43
Discharge: HOME | End: 2023-05-24
Attending: EMERGENCY MEDICINE
Payer: MEDICAID

## 2023-05-24 VITALS
DIASTOLIC BLOOD PRESSURE: 78 MMHG | WEIGHT: 104 LBS | BODY MASS INDEX: 19.63 KG/M2 | OXYGEN SATURATION: 100 % | SYSTOLIC BLOOD PRESSURE: 109 MMHG | HEART RATE: 89 BPM | RESPIRATION RATE: 18 BRPM | TEMPERATURE: 98.6 F | HEIGHT: 61 IN

## 2023-05-24 DIAGNOSIS — G89.29 ACUTE EXACERBATION OF CHRONIC LOW BACK PAIN: Primary | ICD-10-CM

## 2023-05-24 DIAGNOSIS — M54.50 ACUTE EXACERBATION OF CHRONIC LOW BACK PAIN: Primary | ICD-10-CM

## 2023-05-24 DIAGNOSIS — M54.16 LUMBAR RADICULOPATHY: ICD-10-CM

## 2023-05-24 PROCEDURE — 6370000000 HC RX 637 (ALT 250 FOR IP): Performed by: EMERGENCY MEDICINE

## 2023-05-24 PROCEDURE — 99284 EMERGENCY DEPT VISIT MOD MDM: CPT

## 2023-05-24 PROCEDURE — 96372 THER/PROPH/DIAG INJ SC/IM: CPT

## 2023-05-24 PROCEDURE — 72110 X-RAY EXAM L-2 SPINE 4/>VWS: CPT

## 2023-05-24 PROCEDURE — 6360000002 HC RX W HCPCS: Performed by: EMERGENCY MEDICINE

## 2023-05-24 RX ORDER — LIDOCAINE 4 G/G
1 PATCH TOPICAL DAILY
Status: DISCONTINUED | OUTPATIENT
Start: 2023-05-24 | End: 2023-05-24 | Stop reason: HOSPADM

## 2023-05-24 RX ORDER — LIDOCAINE 50 MG/G
1 PATCH TOPICAL DAILY
Qty: 10 PATCH | Refills: 0 | Status: SHIPPED | OUTPATIENT
Start: 2023-05-24

## 2023-05-24 RX ORDER — HYDROCODONE BITARTRATE AND ACETAMINOPHEN 5; 325 MG/1; MG/1
1 TABLET ORAL ONCE
Status: COMPLETED | OUTPATIENT
Start: 2023-05-24 | End: 2023-05-24

## 2023-05-24 RX ORDER — IBUPROFEN 800 MG/1
TABLET ORAL
COMMUNITY
Start: 2023-04-06

## 2023-05-24 RX ORDER — CYCLOBENZAPRINE HCL 10 MG
10 TABLET ORAL 3 TIMES DAILY PRN
Qty: 12 TABLET | Refills: 0 | Status: SHIPPED | OUTPATIENT
Start: 2023-05-24 | End: 2023-05-28

## 2023-05-24 RX ORDER — KETOROLAC TROMETHAMINE 30 MG/ML
30 INJECTION, SOLUTION INTRAMUSCULAR; INTRAVENOUS ONCE
Status: COMPLETED | OUTPATIENT
Start: 2023-05-24 | End: 2023-05-24

## 2023-05-24 RX ORDER — CYCLOBENZAPRINE HCL 10 MG
10 TABLET ORAL ONCE
Status: COMPLETED | OUTPATIENT
Start: 2023-05-24 | End: 2023-05-24

## 2023-05-24 RX ADMIN — CYCLOBENZAPRINE 10 MG: 10 TABLET, FILM COATED ORAL at 12:50

## 2023-05-24 RX ADMIN — HYDROCODONE BITARTRATE AND ACETAMINOPHEN 1 TABLET: 5; 325 TABLET ORAL at 12:01

## 2023-05-24 RX ADMIN — KETOROLAC TROMETHAMINE 30 MG: 30 INJECTION, SOLUTION INTRAMUSCULAR; INTRAVENOUS at 12:02

## 2023-05-24 ASSESSMENT — PAIN SCALES - GENERAL
PAINLEVEL_OUTOF10: 7
PAINLEVEL_OUTOF10: 4
PAINLEVEL_OUTOF10: 10

## 2023-05-24 ASSESSMENT — PAIN DESCRIPTION - ORIENTATION
ORIENTATION: LOWER
ORIENTATION: LOWER

## 2023-05-24 ASSESSMENT — PAIN - FUNCTIONAL ASSESSMENT
PAIN_FUNCTIONAL_ASSESSMENT: ACTIVITIES ARE NOT PREVENTED
PAIN_FUNCTIONAL_ASSESSMENT: 0-10

## 2023-05-24 ASSESSMENT — ENCOUNTER SYMPTOMS
COLOR CHANGE: 0
VOMITING: 0
BACK PAIN: 1
NAUSEA: 0

## 2023-05-24 ASSESSMENT — PAIN DESCRIPTION - LOCATION
LOCATION: BACK
LOCATION: BACK

## 2023-05-24 ASSESSMENT — LIFESTYLE VARIABLES
HOW MANY STANDARD DRINKS CONTAINING ALCOHOL DO YOU HAVE ON A TYPICAL DAY: PATIENT DOES NOT DRINK
HOW OFTEN DO YOU HAVE A DRINK CONTAINING ALCOHOL: NEVER

## 2023-05-24 ASSESSMENT — PAIN DESCRIPTION - PAIN TYPE: TYPE: CHRONIC PAIN

## 2023-05-24 ASSESSMENT — PAIN DESCRIPTION - DESCRIPTORS
DESCRIPTORS: SHARP
DESCRIPTORS: OTHER (COMMENT)

## 2023-05-24 NOTE — ED NOTES
Patient ambulates to bathroom, patient state her pain is improved.       Nuria Marie RN  05/24/23 [id number]

## 2023-05-24 NOTE — ED NOTES
Ticket to ride completed. The following information was reported off:  Name  Allergies  Orientation Level  Destination  Safety Issues  Code Status  Oxygen Requirements  Special needs including mobility, language, communication    Neetu Cordova to transport patient to  in bed.      Nuria Marie RN  05/24/23 [id number]

## 2023-05-24 NOTE — ED PROVIDER NOTES
Gastrointestinal:  Negative for nausea and vomiting. Genitourinary:  Negative for dysuria, flank pain, frequency, hematuria and urgency. Musculoskeletal:  Positive for back pain. Negative for neck pain and neck stiffness. Skin:  Negative for color change, pallor, rash and wound. Neurological:  Negative for dizziness, weakness and numbness. All other systems reviewed and are negative. PAST MEDICAL HISTORY     Past Medical History:   Diagnosis Date    Chronic back pain     Headache          SURGICAL HISTORY       Past Surgical History:   Procedure Laterality Date    DENTAL SURGERY      DILATION AND CURETTAGE OF UTERUS      NASAL SEPTUM SURGERY      OTHER SURGICAL HISTORY Left 05/19/2017    Decompression of L4-5    TIBIA FRACTURE SURGERY      right         CURRENT MEDICATIONS       Previous Medications    ESCITALOPRAM (LEXAPRO) 5 MG TABLET    Take 1 tablet by mouth daily    IBUPROFEN (ADVIL;MOTRIN) 800 MG TABLET    TAKE 1 TABLET BY MOUTH WITH FOOD or milk EVERY 8 HOURS AS NEEDED       ALLERGIES       Prednisone, Meperidine, and Methylprednisolone    FAMILY HISTORY       History reviewed. No pertinent family history. SOCIAL HISTORY       Social History     Tobacco Use    Smoking status: Former     Packs/day: 0.50     Years: 12.00     Pack years: 6.00     Types: Cigarettes    Smokeless tobacco: Never   Vaping Use    Vaping Use: Never used   Substance Use Topics    Alcohol use: No    Drug use: No         PHYSICAL EXAM       ED Triage Vitals   BP Temp Temp src Pulse Resp SpO2 Height Weight   -- -- -- -- -- -- -- --       Physical Exam  Constitutional:       General: She is not in acute distress. Appearance: Normal appearance. She is normal weight. She is not ill-appearing, toxic-appearing or diaphoretic. HENT:      Head: Normocephalic and atraumatic. Cardiovascular:      Rate and Rhythm: Normal rate. Pulses: Normal pulses.    Pulmonary:      Effort: Pulmonary effort is normal.

## 2023-07-31 ENCOUNTER — OFFICE VISIT (OUTPATIENT)
Dept: FAMILY MEDICINE CLINIC | Age: 43
End: 2023-07-31
Payer: MEDICAID

## 2023-07-31 VITALS
WEIGHT: 113.6 LBS | SYSTOLIC BLOOD PRESSURE: 118 MMHG | RESPIRATION RATE: 18 BRPM | HEART RATE: 87 BPM | OXYGEN SATURATION: 97 % | DIASTOLIC BLOOD PRESSURE: 78 MMHG | BODY MASS INDEX: 21.45 KG/M2 | HEIGHT: 61 IN

## 2023-07-31 DIAGNOSIS — M25.561 ACUTE PAIN OF RIGHT KNEE: ICD-10-CM

## 2023-07-31 DIAGNOSIS — F41.9 ANXIETY: Primary | ICD-10-CM

## 2023-07-31 PROCEDURE — 99214 OFFICE O/P EST MOD 30 MIN: CPT | Performed by: INTERNAL MEDICINE

## 2023-07-31 RX ORDER — VENLAFAXINE HYDROCHLORIDE 75 MG/1
75 CAPSULE, EXTENDED RELEASE ORAL DAILY
Qty: 30 CAPSULE | Refills: 3 | Status: SHIPPED | OUTPATIENT
Start: 2023-07-31

## 2023-07-31 ASSESSMENT — ENCOUNTER SYMPTOMS
SHORTNESS OF BREATH: 0
BACK PAIN: 1
COUGH: 0
SORE THROAT: 0
ABDOMINAL PAIN: 0
NAUSEA: 0

## 2023-07-31 NOTE — PROGRESS NOTES
Rate and Rhythm: Normal rate and regular rhythm. Heart sounds: Normal heart sounds. No murmur heard. Pulmonary:      Effort: Pulmonary effort is normal.      Breath sounds: Normal breath sounds. No wheezing or rales. Abdominal:      General: Bowel sounds are normal. There is no distension. Palpations: Abdomen is soft. There is no mass. Tenderness: There is no abdominal tenderness. Musculoskeletal:         General: Tenderness (lumbosacarl area tenderness) present. No swelling. Normal range of motion. Right lower leg: No edema. Left lower leg: No edema. Lymphadenopathy:      Cervical: No cervical adenopathy. Skin:     General: Skin is warm and dry. Coloration: Skin is not jaundiced or pale. Findings: No rash. Neurological:      General: No focal deficit present. Mental Status: She is alert and oriented to person, place, and time. Mental status is at baseline. Psychiatric:         Mood and Affect: Mood normal.         Behavior: Behavior normal.         Thought Content:  Thought content normal.             Data:     Lab Results   Component Value Date/Time     03/07/2023 01:58 PM    K 3.8 03/07/2023 01:58 PM     03/07/2023 01:58 PM    CO2 25 03/07/2023 01:58 PM    BUN 8 03/07/2023 01:58 PM    CREATININE 0.70 03/07/2023 01:58 PM    GLUCOSE 101 03/07/2023 01:58 PM    PROT 7.2 01/15/2023 12:12 PM    LABALBU 4.4 01/15/2023 12:12 PM    BILITOT 0.5 01/15/2023 12:12 PM    ALKPHOS 45 01/15/2023 12:12 PM    AST 16 01/15/2023 12:12 PM    ALT 11 01/15/2023 12:12 PM     Lab Results   Component Value Date/Time    WBC 7.7 03/07/2023 01:58 PM    RBC 4.46 03/07/2023 01:58 PM    HGB 14.0 03/07/2023 01:58 PM    HCT 42.1 03/07/2023 01:58 PM    MCV 94.5 03/07/2023 01:58 PM    MCH 31.4 03/07/2023 01:58 PM    MCHC 33.2 03/07/2023 01:58 PM    RDW 12.8 03/07/2023 01:58 PM     03/07/2023 01:58 PM    MPV NOT REPORTED 09/28/2019 12:55 PM     Lab Results   Component Value

## 2023-08-18 ENCOUNTER — HOSPITAL ENCOUNTER (OUTPATIENT)
Age: 43
End: 2023-08-18
Payer: MEDICAID

## 2023-08-18 ENCOUNTER — HOSPITAL ENCOUNTER (OUTPATIENT)
Dept: GENERAL RADIOLOGY | Age: 43
End: 2023-08-18
Payer: MEDICAID

## 2023-08-18 DIAGNOSIS — M25.561 ACUTE PAIN OF RIGHT KNEE: ICD-10-CM

## 2023-08-18 PROCEDURE — 73564 X-RAY EXAM KNEE 4 OR MORE: CPT

## 2023-08-29 ENCOUNTER — OFFICE VISIT (OUTPATIENT)
Dept: FAMILY MEDICINE CLINIC | Age: 43
End: 2023-08-29
Payer: MEDICAID

## 2023-08-29 VITALS
HEIGHT: 61 IN | HEART RATE: 87 BPM | RESPIRATION RATE: 18 BRPM | DIASTOLIC BLOOD PRESSURE: 78 MMHG | WEIGHT: 119.2 LBS | OXYGEN SATURATION: 93 % | BODY MASS INDEX: 22.51 KG/M2 | SYSTOLIC BLOOD PRESSURE: 115 MMHG

## 2023-08-29 DIAGNOSIS — F41.9 ANXIETY: Primary | ICD-10-CM

## 2023-08-29 DIAGNOSIS — M51.36 LUMBAR DEGENERATIVE DISC DISEASE: ICD-10-CM

## 2023-08-29 PROCEDURE — 99214 OFFICE O/P EST MOD 30 MIN: CPT | Performed by: INTERNAL MEDICINE

## 2023-08-29 RX ORDER — DULOXETIN HYDROCHLORIDE 30 MG/1
30 CAPSULE, DELAYED RELEASE ORAL DAILY
Qty: 30 CAPSULE | Refills: 3 | Status: SHIPPED | OUTPATIENT
Start: 2023-08-29

## 2023-08-29 RX ORDER — GABAPENTIN 300 MG/1
300 CAPSULE ORAL 3 TIMES DAILY
Qty: 90 CAPSULE | Refills: 1 | Status: SHIPPED | OUTPATIENT
Start: 2023-08-29 | End: 2024-02-25

## 2023-08-29 ASSESSMENT — ENCOUNTER SYMPTOMS
SHORTNESS OF BREATH: 0
COUGH: 0
ABDOMINAL PAIN: 0
BACK PAIN: 1
NAUSEA: 0
SORE THROAT: 0

## 2023-08-29 NOTE — PROGRESS NOTES
HPI Notes    Name: Isabella Chambers  : 1980         Chief Complaint:     Chief Complaint   Patient presents with    Anxiety     Patient is stating the last RX didn't change much for her. History of Present Illness:        HPI    Alan Llanes presents to office to follow up for Anxiety and chronic low back pain. Alan Llanes presents as a follow up on anxiety. Current medication for anxiety includes Effexor. Alan Llanes has been on this medication for about one month . The medication is  being tolerated well. Since starting the medication the symptoms of anxiety have not changed. Alan Llanes  is not in counciling. Alan Llanes presents with the complaint of back pain. The pain is located in the lumbar region and midline over the spine. Alan Llanes has a history of back pain. The area has been previously x rayed. The pain is not associated with an injury. The pain does radiate to both legs  The pain is worsened by sitting, standing, walking,  twisting. and improves with rest.  Alan Llanes has tried NSAID treatment. Current pain medications include Ibuprofen, but it's not helping. She tried Tylenol and it did not help. She was on Gabapentin and it helped better and she would like to resume it  She is scheduled to see pain management Dr. Eliseo Hilton on 23.                  Past Medical History:     Past Medical History:   Diagnosis Date    Chronic back pain     Headache       Reviewed all health maintenance requirements and orderedappropriate tests  Health Maintenance Due   Topic Date Due    HIV screen  Never done    Hepatitis C screen  Never done    Cervical cancer screen  Never done    COVID-19 Vaccine (2 - Booster for Lulu series) 2021    Flu vaccine (1) 2023       Past Surgical History:     Past Surgical History:   Procedure Laterality Date    DENTAL SURGERY      DILATION AND CURETTAGE OF UTERUS      NASAL SEPTUM SURGERY      OTHER SURGICAL HISTORY Left 2017    Decompression of L4-5

## 2023-08-31 ENCOUNTER — OFFICE VISIT (OUTPATIENT)
Dept: PAIN MANAGEMENT | Age: 43
End: 2023-08-31
Payer: MEDICAID

## 2023-08-31 VITALS
BODY MASS INDEX: 22.09 KG/M2 | HEIGHT: 61 IN | HEART RATE: 82 BPM | OXYGEN SATURATION: 98 % | WEIGHT: 117 LBS | RESPIRATION RATE: 19 BRPM

## 2023-08-31 DIAGNOSIS — M54.16 LUMBAR RADICULOPATHY: Primary | ICD-10-CM

## 2023-08-31 DIAGNOSIS — M96.1 LUMBAR POST-LAMINECTOMY SYNDROME: ICD-10-CM

## 2023-08-31 DIAGNOSIS — M47.812 CERVICAL SPONDYLOSIS: ICD-10-CM

## 2023-08-31 DIAGNOSIS — M51.36 LUMBAR DEGENERATIVE DISC DISEASE: ICD-10-CM

## 2023-08-31 DIAGNOSIS — M47.816 LUMBAR SPONDYLOSIS: ICD-10-CM

## 2023-08-31 PROCEDURE — 99214 OFFICE O/P EST MOD 30 MIN: CPT | Performed by: STUDENT IN AN ORGANIZED HEALTH CARE EDUCATION/TRAINING PROGRAM

## 2023-08-31 NOTE — PROGRESS NOTES
the low back radiating down into the bilateral legs. Patient has positive neural tension signs on examination with a positive seated straight leg raise. Additionally the lumbar MRI on 2/15/2023 reveals severe disc space narrowing at L5-S1 as well as a broad-based central left paracentral disc protrusion likely impinging the descending left L5 nerve root. The patient has completed physical therapy as well as 3 epidural steroid injections with still significant leg pain. She was told by the neurosurgeon 5 months ago that she needs surgery, however she has not followed up with the surgeon. I highly encouraged patient to follow-up with neurosurgery for further recommendations. I reviewed her external records from previous pain management provider including previous injections, images and treatment plans. I reviewed the cervical MRI with the patient in the room today which showed very minimal cervical spondylosis. This is not an issue for her at this point time, however we will address this in the future if needed. Neurologically, it appears the patient has full strength and normal sensation. There is no evidence of myelopathy on examination. There are no red flags in the patient's history. The patient has failed conservative measures including outpatient physical therapy, greater than 3 medications for pain relief, a self-directed therapy program, as well as activity modification all within the last 6 weeks over 3 months. The patient's pain has been causing worsening quality of life and function. PLAN:  Medications:     For nonopioid therapy, the following medications were prescribed:    -Continue medication prescribed by primary care physician    Opioid therapy:  -Not indicated    Interventions:  -None    Imaging:  -Reviewed cervical MRI with patient in room    Behavioral Therapies:  -Continue daily stretching and home exercise program    Referrals:  -Neurosurgery    Follow-up Plan:  -As

## 2023-10-23 ENCOUNTER — OFFICE VISIT (OUTPATIENT)
Dept: FAMILY MEDICINE CLINIC | Age: 43
End: 2023-10-23
Payer: MEDICAID

## 2023-10-23 VITALS
HEIGHT: 61 IN | RESPIRATION RATE: 18 BRPM | DIASTOLIC BLOOD PRESSURE: 74 MMHG | BODY MASS INDEX: 20.81 KG/M2 | SYSTOLIC BLOOD PRESSURE: 105 MMHG | WEIGHT: 110.2 LBS | OXYGEN SATURATION: 97 % | HEART RATE: 103 BPM

## 2023-10-23 DIAGNOSIS — M51.36 LUMBAR DEGENERATIVE DISC DISEASE: ICD-10-CM

## 2023-10-23 DIAGNOSIS — R92.30 DENSE BREAST TISSUE ON MAMMOGRAM, UNSPECIFIED TYPE: ICD-10-CM

## 2023-10-23 DIAGNOSIS — F41.9 ANXIETY: Primary | ICD-10-CM

## 2023-10-23 DIAGNOSIS — Z80.3 FAMILY HISTORY OF BREAST CANCER: ICD-10-CM

## 2023-10-23 PROCEDURE — 99214 OFFICE O/P EST MOD 30 MIN: CPT | Performed by: INTERNAL MEDICINE

## 2023-10-23 RX ORDER — DULOXETIN HYDROCHLORIDE 30 MG/1
30 CAPSULE, DELAYED RELEASE ORAL DAILY
Qty: 30 CAPSULE | Refills: 5 | Status: SHIPPED | OUTPATIENT
Start: 2023-10-23

## 2023-10-23 RX ORDER — GABAPENTIN 300 MG/1
300 CAPSULE ORAL 3 TIMES DAILY
Qty: 90 CAPSULE | Refills: 1 | Status: SHIPPED | OUTPATIENT
Start: 2023-10-23 | End: 2024-04-20

## 2023-10-23 ASSESSMENT — ENCOUNTER SYMPTOMS
SHORTNESS OF BREATH: 0
COUGH: 0
ABDOMINAL PAIN: 0
SORE THROAT: 0
NAUSEA: 0
BACK PAIN: 1
BOWEL INCONTINENCE: 0

## 2023-10-23 NOTE — PROGRESS NOTES
Maintenance Due   Topic Date Due    Hepatitis B vaccine (1 of 3 - 3-dose series) Never done    HIV screen  Never done    Hepatitis C screen  Never done    Cervical cancer screen  Never done    COVID-19 Vaccine (2 - Booster for Lulu series) 08/23/2021    Flu vaccine (1) 08/01/2023       Past Surgical History:     Past Surgical History:   Procedure Laterality Date    DENTAL SURGERY      DILATION AND CURETTAGE OF UTERUS      NASAL SEPTUM SURGERY      OTHER SURGICAL HISTORY Left 05/19/2017    Decompression of L4-5    TIBIA FRACTURE SURGERY      right        Medications:       Prior to Admission medications    Medication Sig Start Date End Date Taking? Authorizing Provider   gabapentin (NEURONTIN) 300 MG capsule Take 1 capsule by mouth 3 times daily for 180 days. Intended supply: 90 days 10/23/23 4/20/24 Yes Melissa Serrato MD   DULoxetine (CYMBALTA) 30 MG extended release capsule Take 1 capsule by mouth daily 10/23/23  Yes Melissa Serrato MD   ibuprofen (ADVIL;MOTRIN) 800 MG tablet TAKE 1 TABLET BY MOUTH WITH FOOD or milk EVERY 8 HOURS AS NEEDED 4/6/23  Yes Provider, MD Tio        Allergies:       Prednisone, Meperidine, Meperidine hcl, and Methylprednisolone    Social History:     Tobacco: reports that she has quit smoking. Her smoking use included cigarettes. She has a 6.00 pack-year smoking history. She has never used smokeless tobacco.  Alcohol:      reports no history of alcohol use. Drug Use:  reports no history of drug use. Family History:     No family history on file. Review of Systems:         Review of Systems   Constitutional:  Negative for chills and fever. HENT:  Negative for congestion and sore throat. Respiratory:  Negative for cough and shortness of breath. Cardiovascular:  Negative for chest pain and palpitations. Gastrointestinal:  Negative for abdominal pain, bowel incontinence and nausea. Genitourinary:  Negative for bladder incontinence and dysuria.

## 2023-12-26 ENCOUNTER — HOSPITAL ENCOUNTER (OUTPATIENT)
Dept: MRI IMAGING | Age: 43
Discharge: HOME | End: 2023-12-28
Payer: MEDICAID

## 2023-12-26 DIAGNOSIS — M54.16 LUMBAR RADICULOPATHY: ICD-10-CM

## 2023-12-26 DIAGNOSIS — M51.36 DEGENERATION OF LUMBAR INTERVERTEBRAL DISC: ICD-10-CM

## 2023-12-26 PROCEDURE — 72148 MRI LUMBAR SPINE W/O DYE: CPT

## 2024-01-16 DIAGNOSIS — M51.36 LUMBAR DEGENERATIVE DISC DISEASE: ICD-10-CM

## 2024-01-16 RX ORDER — GABAPENTIN 300 MG/1
300 CAPSULE ORAL 3 TIMES DAILY
Qty: 90 CAPSULE | Refills: 0 | Status: SHIPPED | OUTPATIENT
Start: 2024-01-16 | End: 2024-02-15

## 2024-01-26 ENCOUNTER — OFFICE VISIT (OUTPATIENT)
Dept: FAMILY MEDICINE CLINIC | Age: 44
End: 2024-01-26
Payer: MEDICAID

## 2024-01-26 ENCOUNTER — HOSPITAL ENCOUNTER (OUTPATIENT)
Age: 44
Discharge: HOME | End: 2024-01-26
Payer: MEDICAID

## 2024-01-26 VITALS
WEIGHT: 123.8 LBS | SYSTOLIC BLOOD PRESSURE: 115 MMHG | DIASTOLIC BLOOD PRESSURE: 82 MMHG | OXYGEN SATURATION: 98 % | RESPIRATION RATE: 18 BRPM | HEART RATE: 76 BPM | BODY MASS INDEX: 23.37 KG/M2 | HEIGHT: 61 IN

## 2024-01-26 DIAGNOSIS — M51.36 LUMBAR DEGENERATIVE DISC DISEASE: ICD-10-CM

## 2024-01-26 DIAGNOSIS — M48.00 SPINAL STENOSIS, UNSPECIFIED SPINAL REGION: ICD-10-CM

## 2024-01-26 DIAGNOSIS — Z01.818 PREOP TESTING: ICD-10-CM

## 2024-01-26 DIAGNOSIS — Z01.818 PREOP TESTING: Primary | ICD-10-CM

## 2024-01-26 LAB
ANION GAP SERPL CALCULATED.3IONS-SCNC: 10 MMOL/L (ref 9–17)
BASOPHILS # BLD: 0.02 K/UL (ref 0–0.2)
BASOPHILS NFR BLD: 0 % (ref 0–2)
BUN SERPL-MCNC: 8 MG/DL (ref 6–20)
BUN/CREAT SERPL: 11 (ref 9–20)
CALCIUM SERPL-MCNC: 9.4 MG/DL (ref 8.6–10.4)
CHLORIDE SERPL-SCNC: 102 MMOL/L (ref 98–107)
CO2 SERPL-SCNC: 24 MMOL/L (ref 20–31)
CREAT SERPL-MCNC: 0.7 MG/DL (ref 0.5–0.9)
EOSINOPHIL # BLD: 0.12 K/UL (ref 0–0.4)
EOSINOPHILS RELATIVE PERCENT: 2 % (ref 0–5)
ERYTHROCYTE [DISTWIDTH] IN BLOOD BY AUTOMATED COUNT: 12.4 % (ref 12.1–15.2)
GFR SERPL CREATININE-BSD FRML MDRD: >60 ML/MIN/1.73M2
GLUCOSE SERPL-MCNC: 109 MG/DL (ref 70–99)
HCT VFR BLD AUTO: 39.2 % (ref 36–46)
HGB BLD-MCNC: 13.6 G/DL (ref 12–16)
IMM GRANULOCYTES # BLD AUTO: 0.02 K/UL (ref 0–0.3)
IMM GRANULOCYTES NFR BLD: 0 % (ref 0–5)
LYMPHOCYTES NFR BLD: 1.75 K/UL (ref 1–4.8)
LYMPHOCYTES RELATIVE PERCENT: 23 % (ref 15–40)
MCH RBC QN AUTO: 30.9 PG (ref 26–34)
MCHC RBC AUTO-ENTMCNC: 34.7 G/DL (ref 31–37)
MCV RBC AUTO: 89.1 FL (ref 80–100)
MONOCYTES NFR BLD: 0.51 K/UL (ref 0–1)
MONOCYTES NFR BLD: 7 % (ref 4–8)
NEUTROPHILS NFR BLD: 68 % (ref 47–75)
NEUTS SEG NFR BLD: 5.18 K/UL (ref 2.5–7)
PLATELET # BLD AUTO: 202 K/UL (ref 140–450)
PMV BLD AUTO: 10.2 FL (ref 6–12)
POTASSIUM SERPL-SCNC: 4 MMOL/L (ref 3.7–5.3)
RBC # BLD AUTO: 4.4 M/UL (ref 4–5.2)
SODIUM SERPL-SCNC: 136 MMOL/L (ref 135–144)
WBC OTHER # BLD: 7.6 K/UL (ref 3.5–11)

## 2024-01-26 PROCEDURE — 99213 OFFICE O/P EST LOW 20 MIN: CPT | Performed by: INTERNAL MEDICINE

## 2024-01-26 PROCEDURE — 36415 COLL VENOUS BLD VENIPUNCTURE: CPT

## 2024-01-26 PROCEDURE — 80048 BASIC METABOLIC PNL TOTAL CA: CPT

## 2024-01-26 PROCEDURE — 85025 COMPLETE CBC W/AUTO DIFF WBC: CPT

## 2024-01-26 RX ORDER — QUETIAPINE FUMARATE 25 MG/1
TABLET, FILM COATED ORAL
COMMUNITY
Start: 2024-01-16

## 2024-01-26 RX ORDER — FLUOXETINE 20 MG/1
TABLET, FILM COATED ORAL
COMMUNITY
Start: 2024-01-16

## 2024-01-26 RX ORDER — PRAZOSIN HYDROCHLORIDE 1 MG/1
CAPSULE ORAL
COMMUNITY
Start: 2024-01-16

## 2024-01-26 ASSESSMENT — PATIENT HEALTH QUESTIONNAIRE - PHQ9
SUM OF ALL RESPONSES TO PHQ QUESTIONS 1-9: 2
SUM OF ALL RESPONSES TO PHQ QUESTIONS 1-9: 2
1. LITTLE INTEREST OR PLEASURE IN DOING THINGS: 1
2. FEELING DOWN, DEPRESSED OR HOPELESS: 1
SUM OF ALL RESPONSES TO PHQ QUESTIONS 1-9: 2
SUM OF ALL RESPONSES TO PHQ9 QUESTIONS 1 & 2: 2

## 2024-01-26 NOTE — PATIENT INSTRUCTIONS
SURVEY:    You may be receiving a survey from [facility] regarding your visit today.    Please complete the survey to enable us to provide the highest quality of care to you and your family.    If you cannot score us a very good on any question, please call the office to discuss how we could have made your experience a very good one.    Thank you.  [facility]  MD Alicia Moreland, MD Chaka Snyder, DO  Rashaun Parekh, MD Yolie Davenport, APRN-CNP  Jeannine Wilburn, Practice Manager  Shaina, CMA  Osiris, CCMA  Freddy, CMA  Carter, PSC   Marcela, LPN   Dear valued patient,    We hope this message finds you in good health. At [facility], we are committed to providing you with the best possible healthcare experience. To further enhance your convenience and streamline your access to our services, we would like to introduce you to the benefits of utilizing direct scheduling through the [facility] Patient Portal.    Direct scheduling is a feature within the [facility] Patient Portal that allows you to schedule appointments with your healthcare provider directly, without the need to make a phone call or wait for a callback. We understand that your time is danish, and we want to ensure that you have quick and easy access to our services whenever you need them.  Here are some reasons why you should consider utilizing direct scheduling through the [facility] Patient Portal:    1. Convenience: With direct scheduling, you can book appointments at any time that suits you best, 24 hours a day, 7 days a week. No more waiting on hold or playing phone tag with our office staff. It puts you in control of managing your healthcare appointments effortlessly.    2. Accessibility: The [facility] Patient Portal is accessible through your computer, smartphone, or tablet, allowing you to schedule appointments from the comfort of your home, office, or on the go. You can access your medical records, review

## 2024-01-26 NOTE — PROGRESS NOTES
HPI Notes    Name: Shantel Rosen  : 1980         Chief Complaint:     Chief Complaint   Patient presents with    Surgical Clearance     Patient is having Lumbar Stenosis at [facility] and has a list of orders that need completed for pre admission testing.       History of Present Illness:        HPI    The patient presents to office for pre-op clearance for fusion surgery by neurosurgeon Dr. Manjeet Mitchell on 24    Pre-Operative Risk assessment using 2014 ACC/AHA guidelines     Emergent procedure No  Active Cardiac Condition No (decompensated HF, Arrhythmia, MI <3 weeks, severe valve disease)  Risk Level of Procedure Intermediate Risk (intraperitoneal, intrathoracic, HENT, orthopedic, or carotid endarterectomy, etc.)  Revised Cardiac Risk Index Risk factors: None  Measurement of Exercise Tolerance before Surgery >4 Yes    According to the 2014 ACC/AHA pre-operative risk assessment guidelines Shantel Rosen is a low risk for major cardiac complications during a low risk procedure and may continue as planned. Specific medication recommendations are listed below. Medications recommended to continue should be taken with a sip of water even when NPO.     Further recommendations from consultants: None    Medication Recommendations:  Lab work CBC, BMP, MRSA screen       Past Medical History:     Past Medical History:   Diagnosis Date    Chronic back pain     Headache       Reviewed all health maintenance requirements and orderedappropriate tests  Health Maintenance Due   Topic Date Due    Hepatitis B vaccine (1 of 3 - 3-dose series) Never done    HIV screen  Never done    Hepatitis C screen  Never done    Cervical cancer screen  Never done    Flu vaccine (1) 2023       Past Surgical History:     Past Surgical History:   Procedure Laterality Date    DENTAL SURGERY      DILATION AND CURETTAGE OF UTERUS      NASAL SEPTUM SURGERY      OTHER SURGICAL HISTORY Left 2017    Decompression of

## 2024-01-29 ASSESSMENT — ENCOUNTER SYMPTOMS
BACK PAIN: 1
SHORTNESS OF BREATH: 0
SORE THROAT: 0
NAUSEA: 0
ABDOMINAL PAIN: 0
COUGH: 0

## 2024-02-20 ENCOUNTER — OFFICE VISIT (OUTPATIENT)
Dept: FAMILY MEDICINE CLINIC | Age: 44
End: 2024-02-20
Payer: MEDICAID

## 2024-02-20 VITALS
BODY MASS INDEX: 21.71 KG/M2 | HEART RATE: 101 BPM | RESPIRATION RATE: 18 BRPM | WEIGHT: 115 LBS | DIASTOLIC BLOOD PRESSURE: 82 MMHG | OXYGEN SATURATION: 97 % | SYSTOLIC BLOOD PRESSURE: 115 MMHG | HEIGHT: 61 IN

## 2024-02-20 DIAGNOSIS — M48.00 SPINAL STENOSIS, UNSPECIFIED SPINAL REGION: ICD-10-CM

## 2024-02-20 DIAGNOSIS — M51.36 LUMBAR DEGENERATIVE DISC DISEASE: ICD-10-CM

## 2024-02-20 DIAGNOSIS — K59.03 DRUG-INDUCED CONSTIPATION: Primary | ICD-10-CM

## 2024-02-20 DIAGNOSIS — Z48.02 ENCOUNTER FOR STAPLE REMOVAL: ICD-10-CM

## 2024-02-20 PROCEDURE — 99214 OFFICE O/P EST MOD 30 MIN: CPT | Performed by: INTERNAL MEDICINE

## 2024-02-20 RX ORDER — HYDROCODONE BITARTRATE AND ACETAMINOPHEN 7.5; 325 MG/1; MG/1
TABLET ORAL
COMMUNITY

## 2024-02-20 RX ORDER — LUBIPROSTONE 24 UG/1
24 CAPSULE ORAL 2 TIMES DAILY WITH MEALS
Qty: 60 CAPSULE | Refills: 3 | Status: SHIPPED | OUTPATIENT
Start: 2024-02-20

## 2024-02-20 RX ORDER — AMOXICILLIN 250 MG
2 CAPSULE ORAL 2 TIMES DAILY
COMMUNITY
Start: 2024-02-09 | End: 2024-03-10

## 2024-02-20 SDOH — ECONOMIC STABILITY: FOOD INSECURITY: WITHIN THE PAST 12 MONTHS, THE FOOD YOU BOUGHT JUST DIDN'T LAST AND YOU DIDN'T HAVE MONEY TO GET MORE.: NEVER TRUE

## 2024-02-20 SDOH — ECONOMIC STABILITY: FOOD INSECURITY: WITHIN THE PAST 12 MONTHS, YOU WORRIED THAT YOUR FOOD WOULD RUN OUT BEFORE YOU GOT MONEY TO BUY MORE.: NEVER TRUE

## 2024-02-20 SDOH — ECONOMIC STABILITY: INCOME INSECURITY: HOW HARD IS IT FOR YOU TO PAY FOR THE VERY BASICS LIKE FOOD, HOUSING, MEDICAL CARE, AND HEATING?: NOT HARD AT ALL

## 2024-02-20 ASSESSMENT — ENCOUNTER SYMPTOMS
CONSTIPATION: 1
ABDOMINAL PAIN: 1
HEMATOCHEZIA: 0
NAUSEA: 0
BLOATING: 1
SHORTNESS OF BREATH: 0
VOMITING: 0
COUGH: 0
SORE THROAT: 0

## 2024-02-20 NOTE — PROGRESS NOTES
HPI Notes    Name: Shantel Rosen  : 1980         Chief Complaint:     Chief Complaint   Patient presents with    Suture / Staple Removal     Patient states she is well, healing and some soreness but over all ok.    Constipation       History of Present Illness:        Shantel presents to office to follow up after her back surgery on 2024 at [facility] by neurosurgeon , Mayito Burroughs MD     EMR records reviewed.    Surgeries:  24 LUMBAR 4-5, LUMBAR 5-SACRUM 1 LAMINECTOMY, FACETECTOMY, POSTEROLATERAL INTERBODY FUSION, PEDICLE SCREW INSTRUMENTATION  24 INST - O-ARM SPINE  24 INST - POWEREASE MEDTRONIC  24 INST-NEUROMONITORING  24 INST-SOLERA MEDTRONIC 4.75     Shantel states that she developed problems with urinary retention and ended up spending 6 days in the hospital.  She also developed low blood pressure suspected to be side effect from medications and blood loss during surgery.  Blood pressure improved with IV fluid boluses.  She was started on Flomax on 2024 due to urinary retention and the problem resolved.    Patient states that since her surgery her back pain has significantly improved.  She reports no numbness or weakness in lower extremities.  Has no balance problems.  Currently on Norco for pain control.  Supposed to follow-up with the neurosurgeon early March.    States her neurosurgeon was okay for her surgery staples to be removed in our office.  On inspection of her back the wound looks well-healed, no dehiscence, no redness or drainage, has 20 staples in place.    She also states that she has been having issues with constipation since starting on her pain medication Hydrocodone   Tried multiple medications prescription and over-the-counter and still having hard time with bowel movements.    Suture / Staple Removal  The sutures were placed 11 to 14 days ago. She tried nothing since the wound repair. Her temperature was unmeasured

## 2024-02-20 NOTE — PATIENT INSTRUCTIONS
review test results, and communicate with your healthcare provider all in one secure and user-friendly platform.    3. Timesaving: By utilizing direct scheduling, you can avoid unnecessary delays and save danish time. You can easily browse the available appointment slots and select the one that works best for you, eliminating the back-and-forth communication typically required when scheduling via phone.    4. Reminders and notifications: [facility] Patient Portal sends automatic reminders for upcoming appointments, ensuring that you never miss an important visit. You can also receive notifications about test results and important health updates, keeping you well-informed and engaged in your healthcare journey.    5. Increased engagement and collaboration: Direct scheduling encourages greater patient engagement and empowers you to take an active role in managing your healthcare. By accessing the [facility] Patient Portal, you can securely message your healthcare provider, ask questions, request prescription refills, and seek medical advice whenever you need it.    To get started with direct scheduling through the [facility] Patient Portal or to register with [facility], simply visit [email or web address].     We understand that change can sometimes be overwhelming, but we assure you that adopting direct scheduling through the [facility] Patient Portal will enhance your healthcare experience and put you in control of your appointments. Our dedicated staff is available to answer any questions or provide assistance throughout the process.    Thank you for entrusting us with your healthcare needs. We are committed to continuously improving our services and ensuring your satisfaction. Together, let's embrace the future of healthcare convenience and accessibility through direct scheduling on the [facility] Patient Portal.    Wishing you good health and happiness,    Osiris Nowak

## 2024-03-15 ENCOUNTER — OFFICE VISIT (OUTPATIENT)
Dept: FAMILY MEDICINE CLINIC | Age: 44
End: 2024-03-15
Payer: MEDICAID

## 2024-03-15 VITALS
HEART RATE: 84 BPM | DIASTOLIC BLOOD PRESSURE: 68 MMHG | RESPIRATION RATE: 18 BRPM | OXYGEN SATURATION: 95 % | WEIGHT: 123.6 LBS | SYSTOLIC BLOOD PRESSURE: 101 MMHG | BODY MASS INDEX: 23.33 KG/M2 | HEIGHT: 61 IN

## 2024-03-15 DIAGNOSIS — F41.9 ANXIETY: Primary | ICD-10-CM

## 2024-03-15 PROCEDURE — 99213 OFFICE O/P EST LOW 20 MIN: CPT | Performed by: INTERNAL MEDICINE

## 2024-03-15 RX ORDER — OXYCODONE HYDROCHLORIDE AND ACETAMINOPHEN 5; 325 MG/1; MG/1
1 TABLET ORAL
COMMUNITY
Start: 2023-03-08

## 2024-03-15 RX ORDER — QUETIAPINE FUMARATE 25 MG/1
TABLET, FILM COATED ORAL
Qty: 90 TABLET | Refills: 1 | Status: SHIPPED | OUTPATIENT
Start: 2024-03-15

## 2024-03-15 RX ORDER — PRAZOSIN HYDROCHLORIDE 1 MG/1
CAPSULE ORAL
Qty: 90 CAPSULE | Refills: 1 | Status: SHIPPED | OUTPATIENT
Start: 2024-03-15

## 2024-03-15 RX ORDER — FLUOXETINE 20 MG/1
TABLET, FILM COATED ORAL
Qty: 90 TABLET | Refills: 1 | Status: SHIPPED | OUTPATIENT
Start: 2024-03-15

## 2024-03-15 ASSESSMENT — ENCOUNTER SYMPTOMS
SORE THROAT: 0
COUGH: 0
SHORTNESS OF BREATH: 0
NAUSEA: 0
ABDOMINAL PAIN: 0

## 2024-03-15 NOTE — PROGRESS NOTES
TABLET BY MOUTH EVERY MORNING for depession/anxiety    prazosin (MINIPRESS) 1 MG capsule 90 capsule 1     Sig: TAKE 1 TABLET BY MOUTH AT BEDTIME for nightmare

## 2024-05-02 DIAGNOSIS — M51.36 LUMBAR DEGENERATIVE DISC DISEASE: ICD-10-CM

## 2024-05-02 RX ORDER — GABAPENTIN 300 MG/1
300 CAPSULE ORAL 3 TIMES DAILY
Qty: 90 CAPSULE | Refills: 0 | Status: SHIPPED | OUTPATIENT
Start: 2024-05-02 | End: 2024-06-01

## 2024-08-16 ENCOUNTER — OFFICE VISIT (OUTPATIENT)
Dept: OBGYN CLINIC | Age: 44
End: 2024-08-16

## 2024-08-16 VITALS — SYSTOLIC BLOOD PRESSURE: 116 MMHG | BODY MASS INDEX: 23.35 KG/M2 | DIASTOLIC BLOOD PRESSURE: 70 MMHG | HEIGHT: 61 IN

## 2024-08-16 DIAGNOSIS — Z30.46 NEXPLANON REMOVAL: Primary | ICD-10-CM

## 2024-08-16 RX ORDER — LIDOCAINE HYDROCHLORIDE 10 MG/ML
5 INJECTION, SOLUTION INFILTRATION; PERINEURAL ONCE
Status: SHIPPED | OUTPATIENT
Start: 2024-08-16

## 2024-08-16 RX ADMIN — Medication 2 ML: at 12:11

## 2024-08-16 NOTE — PROGRESS NOTES
[facility]YS[facility]  [address] [place] [address]  Dept: [phone]    Patient Name: Shantel Rosen  Patient Age: 43 y.o.  Date of Visit: 2024    SUBJECTIVE    Chief Complaint:   Chief Complaint   Patient presents with    Procedure     Pt presents today as a new pt to us. She is here to have her nexplanon removed today it was place on 21 so it is due to come out she has had issues with it and feels she no longer needs anything at this time anyhow.       Shantel present for Nexplanon removal due to it being . She does not desire reinsertion.     OBJECTIVE    No LMP recorded. (Menstrual status: Irregular periods).    /70 (Position: Sitting)   Ht 1.549 m (5' 1\")   BMI 23.35 kg/m²     Social History     Tobacco Use    Smoking status: Former     Current packs/day: 0.50     Average packs/day: 0.5 packs/day for 12.0 years (6.0 ttl pk-yrs)     Types: Cigarettes    Smokeless tobacco: Never   Vaping Use    Vaping status: Never Used   Substance Use Topics    Alcohol use: No    Drug use: No       Past Medical History:   Diagnosis Date    Chronic back pain     Headache        OB History   No obstetric history on file.       PROCEDURE  The patient was positioned comfortably on our procedure table. She was consented earlier in the appointment and the procedure risk and complications were reviewed. A sterile prep and drape was completed and a 1% xylocaine for local anesthetic was utilized, 2 mL in left upper arm. The skin had a small incision just beyond the proximal tip of the insert and utilizing blunt dissection the stylet was grasped and removed. A sterile dressing and steri-strip was applied with a pressure wrap. The patient tolerated the procedure well.      ASSESSMENT & PLAN  1. Nexplanon removal  Formal restrictions were discussed in detail. She is to notify our office if any swelling, redness, temperature, or limb restriction

## 2024-08-23 DIAGNOSIS — M51.36 LUMBAR DEGENERATIVE DISC DISEASE: ICD-10-CM

## 2024-08-25 RX ORDER — GABAPENTIN 300 MG/1
300 CAPSULE ORAL 3 TIMES DAILY
Qty: 90 CAPSULE | Refills: 0 | Status: SHIPPED | OUTPATIENT
Start: 2024-08-25 | End: 2024-09-24

## 2024-09-30 ENCOUNTER — TELEPHONE (OUTPATIENT)
Dept: FAMILY MEDICINE CLINIC | Age: 44
End: 2024-09-30

## 2024-09-30 NOTE — TELEPHONE ENCOUNTER
Tried contacting patient to schedule OV for Wednesday am at 1100 am from private cell phone. Could not reach patient. LVM for her to return call to the office in regards to the recent phone message.

## 2024-09-30 NOTE — TELEPHONE ENCOUNTER
Patient voiced she would have to have appt today or tomorrow at the latest. Office did receive request via fax for her Pre surgical testing. Please advise. Thank you.    See attached

## 2024-09-30 NOTE — TELEPHONE ENCOUNTER
Patient contacted the office stating she had previous spinal fusion complet and is now having problems with the previous correction. Patient advised she is scheduled to have STAT procedure complete on the fusion, and the providers are requesting surgical clearance from PCP. Patient voiced she was instructed to be seen by PCP today for clearance. Advised patient the office currently has no availability. Informed patient to have provider fax forms to provider to be completed and faxed back out. Patient voiced understanding. She would like a call back from the office once the forms have been complete by the PCP. Please advise. Thank you.

## 2024-10-01 ENCOUNTER — HOSPITAL ENCOUNTER (OUTPATIENT)
Age: 44
Discharge: HOME | End: 2024-10-01
Payer: MEDICAID

## 2024-10-01 ENCOUNTER — TELEPHONE (OUTPATIENT)
Dept: FAMILY MEDICINE CLINIC | Age: 44
End: 2024-10-01

## 2024-10-01 DIAGNOSIS — Z01.818 PRE-OPERATIVE CLEARANCE: Primary | ICD-10-CM

## 2024-10-01 DIAGNOSIS — Z01.818 PRE-OPERATIVE CLEARANCE: ICD-10-CM

## 2024-10-01 DIAGNOSIS — Z01.818 PREOP TESTING: ICD-10-CM

## 2024-10-01 DIAGNOSIS — Z01.818 PREOP TESTING: Primary | ICD-10-CM

## 2024-10-01 LAB
ANION GAP SERPL CALCULATED.3IONS-SCNC: 10 MMOL/L (ref 9–17)
BASOPHILS # BLD: 0.02 K/UL (ref 0–0.2)
BASOPHILS NFR BLD: 0 % (ref 0–2)
BUN SERPL-MCNC: 9 MG/DL (ref 6–20)
BUN/CREAT SERPL: 13 (ref 9–20)
CALCIUM SERPL-MCNC: 9.5 MG/DL (ref 8.6–10.4)
CHLORIDE SERPL-SCNC: 105 MMOL/L (ref 98–107)
CO2 SERPL-SCNC: 25 MMOL/L (ref 20–31)
CREAT SERPL-MCNC: 0.7 MG/DL (ref 0.5–0.9)
EOSINOPHIL # BLD: 0.12 K/UL (ref 0–0.4)
EOSINOPHILS RELATIVE PERCENT: 2 % (ref 0–5)
ERYTHROCYTE [DISTWIDTH] IN BLOOD BY AUTOMATED COUNT: 12.5 % (ref 12.1–15.2)
GFR, ESTIMATED: >90 ML/MIN/1.73M2
GLUCOSE SERPL-MCNC: 99 MG/DL (ref 70–99)
HCT VFR BLD AUTO: 37 % (ref 36–46)
HGB BLD-MCNC: 12.9 G/DL (ref 12–16)
IMM GRANULOCYTES # BLD AUTO: 0.01 K/UL (ref 0–0.3)
IMM GRANULOCYTES NFR BLD: 0 % (ref 0–5)
INR PPP: 1
LYMPHOCYTES NFR BLD: 1.59 K/UL (ref 1–4.8)
LYMPHOCYTES RELATIVE PERCENT: 28 % (ref 15–40)
MCH RBC QN AUTO: 31.4 PG (ref 26–34)
MCHC RBC AUTO-ENTMCNC: 34.9 G/DL (ref 31–37)
MCV RBC AUTO: 90 FL (ref 80–100)
MONOCYTES NFR BLD: 0.41 K/UL (ref 0–1)
MONOCYTES NFR BLD: 7 % (ref 4–8)
NEUTROPHILS NFR BLD: 63 % (ref 47–75)
NEUTS SEG NFR BLD: 3.48 K/UL (ref 2.5–7)
PLATELET # BLD AUTO: 197 K/UL (ref 140–450)
PMV BLD AUTO: 10.6 FL (ref 6–12)
POTASSIUM SERPL-SCNC: 4.2 MMOL/L (ref 3.7–5.3)
PROTHROMBIN TIME: 13.4 SEC (ref 11.5–14.2)
RBC # BLD AUTO: 4.11 M/UL (ref 4–5.2)
SODIUM SERPL-SCNC: 140 MMOL/L (ref 135–144)
WBC OTHER # BLD: 5.6 K/UL (ref 3.5–11)

## 2024-10-01 PROCEDURE — 85610 PROTHROMBIN TIME: CPT

## 2024-10-01 PROCEDURE — 80048 BASIC METABOLIC PNL TOTAL CA: CPT

## 2024-10-01 PROCEDURE — 85025 COMPLETE CBC W/AUTO DIFF WBC: CPT

## 2024-10-01 PROCEDURE — 36415 COLL VENOUS BLD VENIPUNCTURE: CPT

## 2024-10-02 ENCOUNTER — HOSPITAL ENCOUNTER (OUTPATIENT)
Age: 44
Discharge: HOME | End: 2024-10-02
Payer: MEDICAID

## 2024-10-02 ENCOUNTER — OFFICE VISIT (OUTPATIENT)
Dept: FAMILY MEDICINE CLINIC | Age: 44
End: 2024-10-02
Payer: MEDICAID

## 2024-10-02 VITALS — WEIGHT: 110.8 LBS | HEIGHT: 61 IN | BODY MASS INDEX: 20.92 KG/M2 | RESPIRATION RATE: 18 BRPM

## 2024-10-02 DIAGNOSIS — M51.362 DEGENERATION OF INTERVERTEBRAL DISC OF LUMBAR REGION WITH DISCOGENIC BACK PAIN AND LOWER EXTREMITY PAIN: ICD-10-CM

## 2024-10-02 DIAGNOSIS — Z01.818 PRE-OPERATIVE CLEARANCE: Primary | ICD-10-CM

## 2024-10-02 DIAGNOSIS — M48.00 SPINAL STENOSIS, UNSPECIFIED SPINAL REGION: ICD-10-CM

## 2024-10-02 PROCEDURE — 93005 ELECTROCARDIOGRAM TRACING: CPT | Performed by: INTERNAL MEDICINE

## 2024-10-02 PROCEDURE — 99214 OFFICE O/P EST MOD 30 MIN: CPT | Performed by: INTERNAL MEDICINE

## 2024-10-02 RX ORDER — PRAZOSIN HYDROCHLORIDE 1 MG/1
1 CAPSULE ORAL NIGHTLY
COMMUNITY

## 2024-10-02 RX ORDER — LIDOCAINE 50 MG/G
1 PATCH TOPICAL DAILY
COMMUNITY
Start: 2024-09-25

## 2024-10-02 RX ORDER — QUETIAPINE FUMARATE 25 MG/1
25 TABLET, FILM COATED ORAL DAILY
COMMUNITY

## 2024-10-02 RX ORDER — OXYCODONE HYDROCHLORIDE 5 MG/1
5 CAPSULE ORAL EVERY 6 HOURS PRN
COMMUNITY
Start: 2024-09-25 | End: 2024-10-02

## 2024-10-02 RX ORDER — OXYCODONE AND ACETAMINOPHEN 5; 325 MG/1; MG/1
1 TABLET ORAL EVERY 6 HOURS PRN
Qty: 28 TABLET | Refills: 0 | Status: SHIPPED | OUTPATIENT
Start: 2024-10-02 | End: 2024-10-09

## 2024-10-02 ASSESSMENT — ENCOUNTER SYMPTOMS
NAUSEA: 0
CHEST TIGHTNESS: 0
SORE THROAT: 0
COUGH: 0
BACK PAIN: 1
SHORTNESS OF BREATH: 0
ABDOMINAL PAIN: 0

## 2024-10-02 NOTE — PROGRESS NOTES
Date End Date Taking? Authorizing Provider   lidocaine (LIDODERM) 5 % Place 1 patch onto the skin daily 9/25/24  Yes Tio Choe MD   QUEtiapine (SEROQUEL) 25 MG tablet Take 1 tablet by mouth daily   Yes Tio Choe MD   prazosin (MINIPRESS) 1 MG capsule Take 1 capsule by mouth nightly   Yes Tio hCoe MD   gabapentin (NEURONTIN) 300 MG capsule Take 1 capsule by mouth 3 times daily for 30 days. 8/25/24 10/2/24 Yes Michael Temple MD        Allergies:       Prednisone, Meperidine, Meperidine hcl, and Methylprednisolone    Social History:     Tobacco: reports that she has quit smoking. Her smoking use included cigarettes. She has a 6 pack-year smoking history. She has never used smokeless tobacco.  Alcohol:      reports no history of alcohol use.  Drug Use:  reports no history of drug use.    Family History:     No family history on file.    Review of Systems:         Review of Systems   Constitutional:  Negative for chills and fever.   HENT:  Negative for congestion and sore throat.    Respiratory:  Negative for cough, chest tightness and shortness of breath.    Cardiovascular:  Negative for chest pain and palpitations.   Gastrointestinal:  Negative for abdominal pain and nausea.   Genitourinary:  Negative for dysuria.   Musculoskeletal:  Positive for arthralgias, back pain and gait problem.   Skin:  Negative for rash.   Neurological:  Negative for dizziness and headaches.   Psychiatric/Behavioral:  The patient is not nervous/anxious.          Physical Exam:     Vitals:  Resp 18   Ht 1.549 m (5' 1\")   Wt 50.3 kg (110 lb 12.8 oz)   BMI 20.94 kg/m²       Physical Exam  Vitals reviewed.   Constitutional:       General: She is not in acute distress.     Appearance: She is well-developed.   HENT:      Head: Normocephalic and atraumatic.   Neck:      Thyroid: No thyromegaly.   Cardiovascular:      Rate and Rhythm: Normal rate and regular rhythm.      Heart sounds: Normal heart sounds. No

## 2024-10-03 LAB
EKG ATRIAL RATE: 59 BPM
EKG P AXIS: 64 DEGREES
EKG P-R INTERVAL: 136 MS
EKG Q-T INTERVAL: 406 MS
EKG QRS DURATION: 84 MS
EKG QTC CALCULATION (BAZETT): 401 MS
EKG R AXIS: 77 DEGREES
EKG T AXIS: 67 DEGREES
EKG VENTRICULAR RATE: 59 BPM

## 2024-10-14 DIAGNOSIS — M51.369 LUMBAR DEGENERATIVE DISC DISEASE: ICD-10-CM

## 2024-10-15 RX ORDER — GABAPENTIN 300 MG/1
CAPSULE ORAL
Qty: 90 CAPSULE | Refills: 2 | Status: SHIPPED | OUTPATIENT
Start: 2024-10-15 | End: 2025-01-13

## 2024-10-31 PROBLEM — Z01.818 PRE-OPERATIVE CLEARANCE: Status: RESOLVED | Noted: 2024-10-01 | Resolved: 2024-10-31

## 2025-03-28 ENCOUNTER — OFFICE VISIT (OUTPATIENT)
Dept: FAMILY MEDICINE CLINIC | Age: 45
End: 2025-03-28
Payer: MEDICAID

## 2025-03-28 VITALS
BODY MASS INDEX: 20.88 KG/M2 | DIASTOLIC BLOOD PRESSURE: 68 MMHG | HEIGHT: 61 IN | HEART RATE: 78 BPM | RESPIRATION RATE: 189 BRPM | SYSTOLIC BLOOD PRESSURE: 98 MMHG | OXYGEN SATURATION: 98 % | WEIGHT: 110.6 LBS

## 2025-03-28 DIAGNOSIS — H66.001 NON-RECURRENT ACUTE SUPPURATIVE OTITIS MEDIA OF RIGHT EAR WITHOUT SPONTANEOUS RUPTURE OF TYMPANIC MEMBRANE: Primary | ICD-10-CM

## 2025-03-28 DIAGNOSIS — Z12.31 BREAST CANCER SCREENING BY MAMMOGRAM: ICD-10-CM

## 2025-03-28 PROCEDURE — 99214 OFFICE O/P EST MOD 30 MIN: CPT | Performed by: INTERNAL MEDICINE

## 2025-03-28 SDOH — ECONOMIC STABILITY: FOOD INSECURITY: WITHIN THE PAST 12 MONTHS, YOU WORRIED THAT YOUR FOOD WOULD RUN OUT BEFORE YOU GOT MONEY TO BUY MORE.: NEVER TRUE

## 2025-03-28 SDOH — ECONOMIC STABILITY: FOOD INSECURITY: WITHIN THE PAST 12 MONTHS, THE FOOD YOU BOUGHT JUST DIDN'T LAST AND YOU DIDN'T HAVE MONEY TO GET MORE.: NEVER TRUE

## 2025-03-28 ASSESSMENT — PATIENT HEALTH QUESTIONNAIRE - PHQ9
SUM OF ALL RESPONSES TO PHQ QUESTIONS 1-9: 0
1. LITTLE INTEREST OR PLEASURE IN DOING THINGS: NOT AT ALL
2. FEELING DOWN, DEPRESSED OR HOPELESS: NOT AT ALL
SUM OF ALL RESPONSES TO PHQ QUESTIONS 1-9: 0

## 2025-03-28 ASSESSMENT — ENCOUNTER SYMPTOMS
SORE THROAT: 1
SHORTNESS OF BREATH: 0
ABDOMINAL PAIN: 0
RHINORRHEA: 1
NAUSEA: 0
COUGH: 1
CHEST TIGHTNESS: 0

## 2025-03-28 NOTE — PATIENT INSTRUCTIONS
SURVEY:    You may be receiving a survey from [facility] regarding your visit today.    Please complete the survey to enable us to provide the highest quality of care to you and your family.    If you cannot score us a very good on any question, please call the office to discuss how we could have made your experience a very good one.    Thank you.    [facility]  MD Chaka Moreland, DO Ninfa Garrison, CNP  Rashaun Parekh, MD Yolie Davenport, APRN-CNP  Jeannine Wilburn, Practice Manager  Vianey, ANJEL Newell, CCMMARCIO Hayes, CMA  Teresa, CMA  Carter, PSC   Marcela, LPN  Viktoriya, CMA

## 2025-03-28 NOTE — PROGRESS NOTES
HPI Notes    Name: Shantel Rosen  : 1980         Chief Complaint:     Chief Complaint   Patient presents with    Ear Pain     Patietn has ear throat pain.    Orders     Patient needs a US breast screening order due to previous screening, she will need US.       History of Present Illness:        Shantel presents to office for evaluation of pain in the right ear, sore throat, cough, congestion  Symptoms started about one week ago. Has no associated fever or chills.   Used to have ear tubes in the childhood and they have been removed    Ear Pain   There is pain in the right ear. This is a new problem. The current episode started in the past 7 days. The problem occurs constantly. The problem has been gradually worsening. There has been no fever. The pain is moderate. Associated symptoms include coughing, ear discharge, headaches, hearing loss, rhinorrhea and a sore throat. Pertinent negatives include no abdominal pain or rash. She has tried acetaminophen for the symptoms. The treatment provided no relief.           Past Medical History:     Past Medical History:   Diagnosis Date    Chronic back pain     Headache       Reviewed all health maintenance requirements and orderedappropriate tests  Health Maintenance Due   Topic Date Due    HIV screen  Never done    Hepatitis C screen  Never done    Hepatitis B vaccine (1 of 3 - 19+ 3-dose series) Never done    Cervical cancer screen  Never done    Flu vaccine (1) 2024    COVID-19 Vaccine (3 - - season) 2024    DTaP/Tdap/Td vaccine (3 - Td or Tdap) 2024    Colorectal Cancer Screen  Never done    Depression Screen  2025    Breast cancer screen  2025       Past Surgical History:     Past Surgical History:   Procedure Laterality Date    DENTAL SURGERY      DILATION AND CURETTAGE OF UTERUS      NASAL SEPTUM SURGERY      OTHER SURGICAL HISTORY Left 2017    Decompression of L4-5    TIBIA FRACTURE SURGERY      right

## 2025-03-31 ENCOUNTER — HOSPITAL ENCOUNTER (OUTPATIENT)
Dept: MAMMOGRAPHY | Age: 45
Discharge: HOME | End: 2025-04-02
Attending: INTERNAL MEDICINE
Payer: MEDICAID

## 2025-03-31 DIAGNOSIS — Z12.31 BREAST CANCER SCREENING BY MAMMOGRAM: ICD-10-CM

## 2025-03-31 PROCEDURE — 77063 BREAST TOMOSYNTHESIS BI: CPT

## 2025-04-02 ENCOUNTER — RESULTS FOLLOW-UP (OUTPATIENT)
Dept: FAMILY MEDICINE CLINIC | Age: 45
End: 2025-04-02

## 2025-04-04 DIAGNOSIS — M51.369 LUMBAR DEGENERATIVE DISC DISEASE: ICD-10-CM

## 2025-04-04 RX ORDER — GABAPENTIN 300 MG/1
CAPSULE ORAL
Qty: 90 CAPSULE | Refills: 2 | Status: SHIPPED | OUTPATIENT
Start: 2025-04-04 | End: 2025-07-03

## 2025-04-15 ENCOUNTER — TELEPHONE (OUTPATIENT)
Dept: FAMILY MEDICINE CLINIC | Age: 45
End: 2025-04-15

## 2025-04-15 RX ORDER — PREDNISONE 20 MG/1
20 TABLET ORAL DAILY
Qty: 5 TABLET | Refills: 0 | Status: SHIPPED | OUTPATIENT
Start: 2025-04-15 | End: 2025-04-20

## 2025-04-15 NOTE — TELEPHONE ENCOUNTER
Phone call to patient, Patient notified of provider 's comment and voiced understanding to return call if medication does not improve ear pain.

## 2025-04-15 NOTE — TELEPHONE ENCOUNTER
Patient phoned into office stating her ear pain has continued since she was seen 3/28/2025. Patient was wondering if she can be seen.    Please advise.

## 2025-06-18 ENCOUNTER — OFFICE VISIT (OUTPATIENT)
Dept: FAMILY MEDICINE CLINIC | Age: 45
End: 2025-06-18
Payer: MEDICAID

## 2025-06-18 VITALS
WEIGHT: 110 LBS | HEIGHT: 61 IN | DIASTOLIC BLOOD PRESSURE: 68 MMHG | RESPIRATION RATE: 18 BRPM | BODY MASS INDEX: 20.77 KG/M2 | SYSTOLIC BLOOD PRESSURE: 98 MMHG

## 2025-06-18 DIAGNOSIS — M51.362 DEGENERATION OF INTERVERTEBRAL DISC OF LUMBAR REGION WITH DISCOGENIC BACK PAIN AND LOWER EXTREMITY PAIN: ICD-10-CM

## 2025-06-18 DIAGNOSIS — H66.91 OTITIS OF RIGHT EAR: Primary | ICD-10-CM

## 2025-06-18 PROCEDURE — 99214 OFFICE O/P EST MOD 30 MIN: CPT | Performed by: INTERNAL MEDICINE

## 2025-06-18 RX ORDER — LEVOFLOXACIN 500 MG/1
500 TABLET, FILM COATED ORAL DAILY
Qty: 7 TABLET | Refills: 0 | Status: SHIPPED | OUTPATIENT
Start: 2025-06-18 | End: 2025-06-25

## 2025-06-18 RX ORDER — GABAPENTIN 300 MG/1
300 CAPSULE ORAL 3 TIMES DAILY PRN
Qty: 90 CAPSULE | Refills: 1 | Status: SHIPPED | OUTPATIENT
Start: 2025-06-18 | End: 2025-09-16

## 2025-06-18 ASSESSMENT — ENCOUNTER SYMPTOMS
SORE THROAT: 1
ABDOMINAL PAIN: 0
CHEST TIGHTNESS: 0
SHORTNESS OF BREATH: 0
COUGH: 1
NAUSEA: 0

## 2025-06-18 NOTE — PROGRESS NOTES
HPI Notes    Name: Shantel Rosen  : 1980         Chief Complaint:     Chief Complaint   Patient presents with    Referral - General     Patient is asking for a referral to ENT for L ear pain, noise reduction has went through 2 rounds of antibiotics with no success.       History of Present Illness:        Shantel presents to office for evaluation of pain in the right ear  The problem started about 3 months ago and persisting  She tried 2 rounds of Antibiotics and Prednisone. Symptoms initially improve but then come back again     Ear Pain   There is pain in the right ear. This is a recurrent problem. The current episode started more than 1 month ago. The problem occurs constantly. The problem has been unchanged. There has been no fever. The pain is moderate. Associated symptoms include coughing, ear discharge, headaches, hearing loss and a sore throat. Pertinent negatives include no abdominal pain or rash. She has tried antibiotics and acetaminophen (Prednisone) for the symptoms. The treatment provided no relief.           Past Medical History:     Past Medical History:   Diagnosis Date    Chronic back pain     Headache       Reviewed all health maintenance requirements and orderedappropriate tests  Health Maintenance Due   Topic Date Due    HIV screen  Never done    Hepatitis C screen  Never done    Hepatitis B vaccine (1 of 3 - 19+ 3-dose series) Never done    Cervical cancer screen  Never done    COVID-19 Vaccine (3 - 2024-25 season) 2024    DTaP/Tdap/Td vaccine (3 - Td or Tdap) 2024    Colorectal Cancer Screen  Never done       Past Surgical History:     Past Surgical History:   Procedure Laterality Date    DENTAL SURGERY      DILATION AND CURETTAGE OF UTERUS      NASAL SEPTUM SURGERY      OTHER SURGICAL HISTORY Left 2017    Decompression of L4-5    TIBIA FRACTURE SURGERY      right        Medications:       Prior to Admission medications    Medication Sig Start Date End Date

## 2025-07-05 ENCOUNTER — HOSPITAL ENCOUNTER (EMERGENCY)
Age: 45
Discharge: HOME | End: 2025-07-05
Payer: MEDICAID

## 2025-07-05 VITALS
HEIGHT: 61 IN | DIASTOLIC BLOOD PRESSURE: 91 MMHG | SYSTOLIC BLOOD PRESSURE: 131 MMHG | OXYGEN SATURATION: 96 % | BODY MASS INDEX: 19.26 KG/M2 | HEART RATE: 104 BPM | RESPIRATION RATE: 20 BRPM | TEMPERATURE: 97.3 F | WEIGHT: 102 LBS

## 2025-07-05 DIAGNOSIS — H60.391 INFECTIVE OTITIS EXTERNA OF RIGHT EAR: Primary | ICD-10-CM

## 2025-07-05 PROCEDURE — 99283 EMERGENCY DEPT VISIT LOW MDM: CPT

## 2025-07-05 RX ORDER — CIPROFLOXACIN AND DEXAMETHASONE 3; 1 MG/ML; MG/ML
4 SUSPENSION/ DROPS AURICULAR (OTIC) 2 TIMES DAILY
Qty: 7.5 ML | Refills: 0 | Status: SHIPPED | OUTPATIENT
Start: 2025-07-05 | End: 2025-07-12

## 2025-07-05 RX ORDER — IBUPROFEN 600 MG/1
600 TABLET, FILM COATED ORAL 3 TIMES DAILY PRN
Qty: 15 TABLET | Refills: 0 | Status: SHIPPED | OUTPATIENT
Start: 2025-07-05 | End: 2025-07-10

## 2025-07-05 ASSESSMENT — PAIN DESCRIPTION - DESCRIPTORS: DESCRIPTORS: SHARP

## 2025-07-05 ASSESSMENT — PAIN - FUNCTIONAL ASSESSMENT: PAIN_FUNCTIONAL_ASSESSMENT: 0-10

## 2025-07-05 NOTE — ED PROVIDER NOTES
[facility]  EMERGENCY DEPARTMENT ENCOUNTER      Pt Name: Shantel Rosen  MRN: [id number]  Birthdate 1980  Date of evaluation: 7/5/2025  Provider: Severo Brock MD    CHIEF COMPLAINT       Chief Complaint   Patient presents with    Ear Pain     Rt ear pain . Pt reports pain begun in January. She has been on ATB , urgent care, more ATB and referred to ENT       HISTORY OF PRESENT ILLNESS      Shantel Rosen is a 44 y.o., female ,  has a past medical history of Chronic back pain and Headache. who was evaluated in the emergency room for Ear Pain.  Patient has been dealing with right ear pain for months, she has taken 3 rounds of antibiotics, which temporarily resolve her symptoms, but the pain returns. She was given a referral to see ENT physician, but she was told that she will hear from ENT clinic, it has been about 2 weeks but she hasn't received any phone calls from them.   She last used antibiotics approximately 2 weeks ago.   Doesn't remember the name of the antibiotics.   She reports drainage from the right ear, pain worsening and extension into the whole right side of the face. Also report decreased/muffled hearing from the right ear.  She denies any instrumentation to the ear.      REVIEW OF SYSTEMS       Review of system: Negative except for what is mentioned in the HPI.      PAST MEDICAL HISTORY     Past Medical History:   Diagnosis Date    Chronic back pain     Headache        SURGICAL HISTORY       Past Surgical History:   Procedure Laterality Date    DENTAL SURGERY      DILATION AND CURETTAGE OF UTERUS      NASAL SEPTUM SURGERY      OTHER SURGICAL HISTORY Left 05/19/2017    Decompression of L4-5    TIBIA FRACTURE SURGERY      right       CURRENT MEDICATIONS       Discharge Medication List as of 7/5/2025 12:24 PM        CONTINUE these medications which have NOT CHANGED    Details   gabapentin (NEURONTIN) 300 MG capsule Take 1 capsule by mouth 3 times daily as needed (back pain) for up

## 2025-07-18 ENCOUNTER — HOSPITAL ENCOUNTER (EMERGENCY)
Age: 45
Discharge: HOME | End: 2025-07-18
Attending: EMERGENCY MEDICINE
Payer: MEDICAID

## 2025-07-18 VITALS
WEIGHT: 100.9 LBS | BODY MASS INDEX: 18.57 KG/M2 | DIASTOLIC BLOOD PRESSURE: 93 MMHG | OXYGEN SATURATION: 99 % | TEMPERATURE: 97.4 F | SYSTOLIC BLOOD PRESSURE: 151 MMHG | RESPIRATION RATE: 20 BRPM | HEART RATE: 109 BPM | HEIGHT: 62 IN

## 2025-07-18 DIAGNOSIS — H92.01 ACUTE OTALGIA, RIGHT: Primary | ICD-10-CM

## 2025-07-18 PROCEDURE — 99282 EMERGENCY DEPT VISIT SF MDM: CPT

## 2025-07-18 RX ORDER — IBUPROFEN 200 MG
200 TABLET ORAL EVERY 6 HOURS PRN
COMMUNITY

## 2025-07-18 ASSESSMENT — PAIN SCALES - GENERAL
PAINLEVEL_OUTOF10: 5
PAINLEVEL_OUTOF10: 5

## 2025-07-18 ASSESSMENT — PAIN DESCRIPTION - DESCRIPTORS: DESCRIPTORS: OTHER (COMMENT)

## 2025-07-18 ASSESSMENT — PAIN - FUNCTIONAL ASSESSMENT: PAIN_FUNCTIONAL_ASSESSMENT: 0-10

## 2025-07-18 ASSESSMENT — LIFESTYLE VARIABLES: HOW MANY STANDARD DRINKS CONTAINING ALCOHOL DO YOU HAVE ON A TYPICAL DAY: PATIENT DOES NOT DRINK

## 2025-07-18 ASSESSMENT — PAIN DESCRIPTION - PAIN TYPE: TYPE: ACUTE PAIN;CHRONIC PAIN

## 2025-07-18 ASSESSMENT — PAIN DESCRIPTION - LOCATION: LOCATION: EAR

## 2025-07-18 ASSESSMENT — PAIN DESCRIPTION - ORIENTATION: ORIENTATION: RIGHT

## 2025-07-18 NOTE — DISCHARGE INSTRUCTIONS
If you develop a fever, severe headache, severe neck pain or feel confused or have alterations in mental status, return immediately.

## 2025-07-18 NOTE — ED PROVIDER NOTES
ED NOTE       Chief Complaint   Patient presents with    Ear Pain     Right ear pain.. Pt states \" about 6 months\" Been seen by PCP, janneth seen in ED. Has been on ATB \"5 times, ear drop and steroids \" per patient.           44 y.o. female to ED with complaint of right ear pain for approximately 6 months.  Patient notes that she has had multiple visits for same, has taken at least 3 oral antibiotic courses, as well as eardrops with no significant relief.  On review of the most recent 2 visits, patient had evidence of otitis seeing her primary care physician, was put on Levaquin.  Followed with the emergency department on 7/5/2025, had evidence of otitis externa, and was put on both oral and otic antibiotics.  She notes that symptoms have not improved, describes pain as a sharp stabbing pain inside the ear.  She noted that she had some fluid drainage from her ear 2 to 3 days ago, but this has stopped.  She also noted she had some drainage from her left eye 3 days ago, but this has stopped.  She denies fevers, chills, sweats, rash, neck pain or stiffness.  She notes that she had what sounds like a tympanoplasty as a child secondary to a ruptured tympanic membrane.          Past Medical History:   Diagnosis Date    Chronic back pain     Headache              Social History     Tobacco Use    Smoking status: Former     Current packs/day: 0.50     Average packs/day: 0.5 packs/day for 12.0 years (6.0 ttl pk-yrs)     Types: Cigarettes    Smokeless tobacco: Never   Vaping Use    Vaping status: Never Used   Substance Use Topics    Alcohol use: No    Drug use: No              A complete review of systems was performed and is negative for other complaint not mentioned in the HPI.     PE:  BP (!) 151/93   Pulse (!) 109   Temp 97.4 °F (36.3 °C) (Oral)   Resp 20   Ht 1.575 m (5' 2\")   Wt 45.8 kg (100 lb 14.4 oz)   LMP 07/05/2025   SpO2 99%   BMI 18.45 kg/m²   GEN: Alert, oriented, no apparent distress  HEENT:  ALMA NEAL,  verbalizes understanding.  Patient, and parents or family if present, were given opportunity ask questions.  All questions were answered.  Stable for discharge.    Impression:      ICD-10-CM    1. Acute otalgia, right  H92.01          Disposition:  home        Kathleen Silva MD      (Please note that portions of this note were completed with a voice recognition program. Efforts were made to edit the dictations but occasionally words are mis-transcribed.)             Kathleen Silva MD  07/18/25 1500

## 2025-07-22 DIAGNOSIS — H66.91 OTITIS OF RIGHT EAR: Primary | ICD-10-CM
